# Patient Record
Sex: MALE | HISPANIC OR LATINO | Employment: FULL TIME | URBAN - METROPOLITAN AREA
[De-identification: names, ages, dates, MRNs, and addresses within clinical notes are randomized per-mention and may not be internally consistent; named-entity substitution may affect disease eponyms.]

---

## 2019-05-20 ENCOUNTER — OFFICE VISIT (OUTPATIENT)
Dept: FAMILY MEDICINE | Facility: CLINIC | Age: 29
End: 2019-05-20
Payer: COMMERCIAL

## 2019-05-20 VITALS
BODY MASS INDEX: 21.98 KG/M2 | HEIGHT: 72 IN | WEIGHT: 162.3 LBS | HEART RATE: 72 BPM | TEMPERATURE: 98.3 F | SYSTOLIC BLOOD PRESSURE: 125 MMHG | DIASTOLIC BLOOD PRESSURE: 72 MMHG

## 2019-05-20 DIAGNOSIS — K13.70 MOUTH LESION: ICD-10-CM

## 2019-05-20 DIAGNOSIS — Z11.3 SCREEN FOR STD (SEXUALLY TRANSMITTED DISEASE): ICD-10-CM

## 2019-05-20 DIAGNOSIS — Z00.00 ROUTINE HISTORY AND PHYSICAL EXAMINATION OF ADULT: Primary | ICD-10-CM

## 2019-05-20 DIAGNOSIS — Z86.69 HISTORY OF SCIATICA: ICD-10-CM

## 2019-05-20 DIAGNOSIS — Z13.1 SCREENING FOR DIABETES MELLITUS: ICD-10-CM

## 2019-05-20 DIAGNOSIS — M25.531 RIGHT WRIST PAIN: ICD-10-CM

## 2019-05-20 DIAGNOSIS — R20.2 RIGHT HAND PARESTHESIA: ICD-10-CM

## 2019-05-20 DIAGNOSIS — Z13.220 LIPID SCREENING: ICD-10-CM

## 2019-05-20 PROCEDURE — 99385 PREV VISIT NEW AGE 18-39: CPT | Performed by: INTERNAL MEDICINE

## 2019-05-20 PROCEDURE — 99213 OFFICE O/P EST LOW 20 MIN: CPT | Mod: 25 | Performed by: INTERNAL MEDICINE

## 2019-05-20 ASSESSMENT — MIFFLIN-ST. JEOR: SCORE: 1726.25

## 2019-05-20 NOTE — PROGRESS NOTES
SUBJECTIVE:   CC: Ten Haro is an 28 year old male who presents for preventative health visit.     Healthy Habits:     Getting at least 3 servings of Calcium per day:  NO (1-2 servins per day)    Bi-annual eye exam:  Yes    Dental care twice a year:  NO (once)    Sleep apnea or symptoms of sleep apnea:  None    Diet:  Regular (no restrictions)    Frequency of exercise:  None (Detail cars, moving around)    Duration of exercise:  N/A    Taking medications regularly:  Not Applicable    Barriers to taking medications:  Not applicable    Medication side effects:  Not applicable    PHQ-2 Total Score: 0    Additional concerns today:  Yes (Right wrist pain, bump on left top of mouth)      Today's PHQ-2 Score:   PHQ-2 ( 1999 Pfizer) 5/20/2019   Q1: Little interest or pleasure in doing things 0   Q2: Feeling down, depressed or hopeless 0   PHQ-2 Score 0       Abuse: Current or Past(Physical, Sexual or Emotional)- No  Do you feel safe in your environment? Yes    Social History     Tobacco Use     Smoking status: Never Smoker     Smokeless tobacco: Never Used   Substance Use Topics     Alcohol use: Yes     Comment: 1 drink per month     If you drink alcohol do you typically have >3 drinks per day or >7 drinks per week? No    Alcohol Use 5/20/2019   Prescreen: >3 drinks/day or >7 drinks/week? No       Last PSA: No results found for: PSA    Reviewed orders with patient. Reviewed health maintenance and updated orders accordingly - Yes  Labs reviewed in EPIC  BP Readings from Last 3 Encounters:   05/20/19 125/72   08/28/14 118/81    Wt Readings from Last 3 Encounters:   05/20/19 73.6 kg (162 lb 4.8 oz)   08/28/14 63.5 kg (140 lb)                  Patient Active Problem List   Diagnosis     Mouth lesion     Right hand paresthesia     Right wrist pain     Routine history and physical examination of adult     History of sciatica     History reviewed. No pertinent surgical history.    Social History     Tobacco Use      Smoking status: Never Smoker     Smokeless tobacco: Never Used   Substance Use Topics     Alcohol use: Yes     Comment: 1 drink per month     Family History   Problem Relation Age of Onset     Hypertension Mother      Asthma Sister          No current outpatient medications on file.     No Known Allergies    Reviewed and updated as needed this visit by clinical staff  Tobacco  Allergies  Meds  Problems  Med Hx  Surg Hx  Fam Hx  Soc Hx          Reviewed and updated as needed this visit by Provider  Tobacco  Allergies  Meds  Problems  Med Hx  Surg Hx  Fam Hx  Soc Hx         History reviewed. No pertinent past medical history.   History reviewed. No pertinent surgical history.  OB History   No data available       Review of Systems  CONSTITUTIONAL: NEGATIVE for fever, chills, change in weight denies snoring.  INTEGUMENTARY/SKIN: NEGATIVE for worrisome rashes, moles or lesions  EYES: NEGATIVE for vision changes or irritation surgery of laser surgery to both eyes.  ENT: NEGATIVE for ear, mouth and throat problems denies any hearing issues.  RESP: NEGATIVE for significant cough or SOB  CV: NEGATIVE for chest pain, palpitations or peripheral edema  GI: NEGATIVE for nausea, abdominal pain, heartburn, or change in bowel habits   male: negative for dysuria, hematuria, decreased urinary stream, erectile dysfunction, urethral discharge.  Denies any history of STDs  MUSCULOSKELETAL: NEGATIVE for significant arthralgias or myalgia, complains of right wrist pain.  Started after gardening he was hitting the ground hard on his wrist hurts to move.  Also complains of numbness of the right hand last 2 fingers feel cold or turn numbness and tingling mainly.  Denies any neck issues or pain.  Does have low back pain; the pain radiates from the left lower pelvis to the left leg he was told he had sciatica in the past was seen by chiropractor 2 years ago and had some adjustment done.  Currently has no symptoms  "related.  NEURO: NEGATIVE for weakness, dizziness. Also complains of numbness of the right hand last 2 fingers feel cold or turn numbness and tingling mainly.   PSYCHIATRIC: NEGATIVE for changes in mood or affect denies any depression or anxiety.    OBJECTIVE:   /72 (BP Location: Right arm, Cuff Size: Adult Regular)   Pulse 72   Temp 98.3  F (36.8  C) (Oral)   Ht 1.816 m (5' 11.5\")   Wt 73.6 kg (162 lb 4.8 oz)   BMI 22.32 kg/m      Physical Exam  GENERAL: healthy, alert and no distress  EYES: Eyes grossly normal to inspection, PERRL and conjunctivae and sclerae normal  HENT: ear canals and TM's normal, nose and mouth without ulcers or lesions  NECK: no adenopathy, no asymmetry, masses, or scars and thyroid normal to palpation  RESP: lungs clear to auscultation - no rales, rhonchi or wheezes  CV: regular rate and rhythm, normal S1 S2, no S3 or S4, no murmur, click or rub, no peripheral edema and peripheral pulses strong  ABDOMEN: soft, nontender, no hepatosplenomegaly, no masses and bowel sounds normal  MS: no gross musculoskeletal defects noted, no edema.  Right wrist no swelling or edema no signs of acute synovitis.  Full range of motion of wrist with passive movement and against resistance.  Negative radial Tinel and Phalen sign.  Negative ulnar Tinel sign.  SKIN: no suspicious lesions or rashes.  Normal temperature of right hand fingers.  NEURO: Normal strength and tone, mentation intact and speech normal.  Intact sensation to monofilament test in the right hand fingers.  Motor power of the right hand digits.  PSYCH: mentation appears normal, affect: Slightly flat apathy.  : Normal testicles and penis.  No inguinal hernias.    Diagnostic Test Results:  Labs reviewed in Epic    ASSESSMENT/PLAN:   Ten was seen today for physical.    Diagnoses and all orders for this visit:    Routine history and physical examination of adult    Screen for STD (sexually transmitted disease)  -     HIV Screening; " "Future  -     HSV 1 and 2 DNA by PCR; Future  -     **Hepatitis C Screen Reflex to RNA FUTURE anytime; Future  -     NEISSERIA GONORRHOEA PCR; Future  -     CHLAMYDIA TRACHOMATIS PCR; Future  -     Treponema Abs w Reflex to RPR and Titer; Future    Lipid screening  -     Lipid panel reflex to direct LDL Fasting; Future    Screening for diabetes mellitus  -     **Glucose FUTURE anytime; Future    Mouth lesion  -     OTOLARYNGOLOGY REFERRAL    Right wrist pain    Right hand paresthesia    History of sciatica    For the wrist pain there is no signs of acute synovitis on exam.  Advised to wear a wrist splint.  For the next 2 to 3 weeks and reassess.  If persistent pain will consider further imaging/testing, there is no swelling of wrist and has full range of motion of the wrist.  Intact neurovascular exam of the right upper extremity.  As far as the tingling and feeling cold of the distal digits of the fourth and fifth digits of the right hand could be a pattern of ulnar neuropathy although Tinnel and Phalen signs were negative and intact neurologic exam and motor strength of right hand,  If symptoms progress will consider EMG/nerve conduction velocity testing.  Patient to follow-up in the next couple weeks for reassessment.  Take as needed Non-steroidal's, stressed the importance of wrist splint for now.    Time spent face-to-face was 15 minutes minutes with more than 50% counseling, review of records and addressing multiple health problems as listed in Assessment Plan in addition to physical exam..    COUNSELING:   Reviewed preventive health counseling, as reflected in patient instructions  Special attention given to:        Regular exercise       Healthy diet/nutrition       Immunizations: up-to-date         Safe sex practices/STD prevention    Estimated body mass index is 22.32 kg/m  as calculated from the following:    Height as of this encounter: 1.816 m (5' 11.5\").    Weight as of this encounter: 73.6 kg (162 lb " 4.8 oz).          reports that he has never smoked. He has never used smokeless tobacco.      Counseling Resources:  ATP IV Guidelines  Pooled Cohorts Equation Calculator  FRAX Risk Assessment  ICSI Preventive Guidelines  Dietary Guidelines for Americans, 2010  USDA's MyPlate  ASA Prophylaxis  Lung CA Screening    Jen Perez MD  [facility]

## 2019-12-24 ENCOUNTER — HOSPITAL ENCOUNTER (EMERGENCY)
Facility: CLINIC | Age: 29
Discharge: HOME | End: 2019-12-24
Attending: EMERGENCY MEDICINE | Admitting: EMERGENCY MEDICINE
Payer: COMMERCIAL

## 2019-12-24 VITALS
RESPIRATION RATE: 18 BRPM | SYSTOLIC BLOOD PRESSURE: 134 MMHG | WEIGHT: 155 LBS | HEART RATE: 71 BPM | TEMPERATURE: 98.1 F | DIASTOLIC BLOOD PRESSURE: 83 MMHG | OXYGEN SATURATION: 99 % | BODY MASS INDEX: 21.32 KG/M2

## 2019-12-24 DIAGNOSIS — K01.1 IMPACTED THIRD MOLAR TOOTH: ICD-10-CM

## 2019-12-24 DIAGNOSIS — K04.7 DENTAL ABSCESS: ICD-10-CM

## 2019-12-24 PROCEDURE — 99284 EMERGENCY DEPT VISIT MOD MDM: CPT | Mod: 25

## 2019-12-24 PROCEDURE — 64400 NJX AA&/STRD TRIGEMINAL NRV: CPT

## 2019-12-24 NOTE — ED PROVIDER NOTES
Visit Date:   12/24/2019      CHIEF COMPLAINT:  Dental pain.      HISTORY OF PRESENT ILLNESS:  This is a 29-year-old male who is here complaining of increasing swelling to his right lower jaw that has been increasing over the past few days.  It hurts to swallow.  He has no difficulty swallowing, no difficulty breathing.  No difficulty with secretions.  No fever.  Pain is worsened with chewing.  He has no wisdom teeth in that area.      ALLERGIES:  NONE.      MEDICATIONS:  None.      PAST SURGICAL HISTORY:  None.      SOCIAL HISTORY:  The patient does not smoke cigarettes and does not drink alcohol.      REVIEW OF SYSTEMS:   CONSTITUTIONAL:  Negative for fevers.   HEENT:  Positive for dental pain.     All other review of systems are negative.      PHYSICAL EXAMINATION:   VITAL SIGNS:  Blood pressure 134/83, temperature is 98.1, pulse 71, respiratory rate 18, pulse ox 99% on room air.   GENERAL:  The patient is well-appearing, nontoxic.   ORAL:  He has mild swelling to his right lower mucosal lining of his impacted third molar.  No significant swelling is noted.   NECK:  Supple.   NEUROLOGIC:  The patient is awake and alert.  No meningeal signs.   DERMATOLOGIC:  No facial erythema or swelling.      EMERGENCY DEPARTMENT COURSE AND TREATMENT:  The patient was seen by ED physician and ED nurse.  All findings were reviewed.  All questions were answered.      PROCEDURE NOTE:  Right lower inferior alveolar dental block.  Prep, lidocaine gel.  Benzocaine 20% was applied to area.  A 27-gauge needle was inserted into the approximated space of the inferior alveolar nerve to the right lower mandible, and 5 mL of bupivacaine without epinephrine 0.5% was instilled without complication.      MEDICAL DECISION MAKING:  This is a 29-year-old male who came in for dental pain with what appears to be developing an impacted third molar, possible early abscess.  He did get relief from a dental block.  See procedure note.  While he has pain  with swallowing. he has no difficulty swallowing or holding his secretions.  No concerns for abscess pushing along his airway.  He has been made aware and given warning signs of this.  He is otherwise safe for discharge.  He will be discharged home with a course of Augmentin and told to follow up with a dentist or oral surgeon.  He should return for any expanding swelling, difficulty swallowing, breathing, drooling.      DISPOSITION:  Home.  Follow up with dentist.      DIAGNOSES:   1.  Third molar impacted.   2.  Dental abscess.         MICHI SHARIF MD             D: 2019   T: 2019   MT: SANDEE      Name:     JENISE CEVALLOS   MRN:      [id number]        Account:      [id number]   :      1990           Visit Date:   2019      Document: [id number]

## 2019-12-24 NOTE — ED PROVIDER NOTES
"  History     Chief Complaint:  ***  No chief complaint on file.      HPI   Ten Haro is a 29 year old male who presents with ***    Allergies:  ***  No Known Drug Allergies     Medications:    ***  Medications reviewed. No pertinent medications.      Past Medical History:    ***  Mouth lesion  Right hand paraesthesi  Sciatica     Past Surgical History:    ***  Surgical history reviewed. No pertinent surgical history.     Family History:    ***  Asthma  Hypertension     Social History:  The patient was accompanied to the ED by ***.  Smoking Status: Never Smoker  Smokeless Tobacco: Never Used  Alcohol Use: Positive  Drug Use: Negative  PCP: Jen Perez   Marital Status:  Single      Review of Systems  ***    Physical Exam   No data found.       Physical Exam  ***    Emergency Department Course   ECG:  ***    Imaging:  {Radiographic findings?:095389029::\" \"}  ***    Laboratory:  ***    Procedures:  ***        Interventions:  ***  {Response to meds:283517::\" \"}    Emergency Department Course:  ***       Impression & Plan       {trauma activation?:679894::\" \"}  CMS Diagnoses: {Sepsis/Septic Shock/Stemi/Stroke:099033::\" \"}       Medical Decision Making:  ***  Critical Care time:  {none or minutes:246702::\"none\"}    Diagnosis:  No diagnosis found.    Disposition:  {discharged to home/discharged to home with.../Admitted to...:286563}    Discharge Medications:  New Prescriptions    No medications on file       Anthony Clark  12/24/2019   [facility] EMERGENCY DEPARTMENT    "

## 2019-12-24 NOTE — ED AVS SNAPSHOT
[facility] Emergency Department  [address]  [facility] [address]  Phone:  [phone]  Fax:  [phone]                                    Ten Haro   MRN: [id number]    Department:  [facility] Emergency Department   Date of Visit:  12/24/2019           After Visit Summary Signature Page    I have received my discharge instructions, and my questions have been answered. I have discussed any challenges I see with this plan with the nurse or doctor.    ..........................................................................................................................................  Patient/Patient Representative Signature      ..........................................................................................................................................  Patient Representative Print Name and Relationship to Patient    ..................................................               ................................................  Date                                   Time    ..........................................................................................................................................  Reviewed by Signature/Title    ...................................................              ..............................................  Date                                               Time          22EPIC Rev 08/18

## 2020-06-19 ENCOUNTER — OFFICE VISIT (OUTPATIENT)
Dept: URGENT CARE | Facility: URGENT CARE | Age: 30
End: 2020-06-19
Payer: COMMERCIAL

## 2020-06-19 VITALS
WEIGHT: 164 LBS | TEMPERATURE: 98.8 F | HEART RATE: 71 BPM | BODY MASS INDEX: 23.48 KG/M2 | SYSTOLIC BLOOD PRESSURE: 125 MMHG | HEIGHT: 70 IN | OXYGEN SATURATION: 100 % | RESPIRATION RATE: 18 BRPM | DIASTOLIC BLOOD PRESSURE: 79 MMHG

## 2020-06-19 DIAGNOSIS — R09.81 CONGESTION OF PARANASAL SINUS: Primary | ICD-10-CM

## 2020-06-19 DIAGNOSIS — R42 DIZZINESS: ICD-10-CM

## 2020-06-19 PROCEDURE — 99203 OFFICE O/P NEW LOW 30 MIN: CPT | Performed by: FAMILY MEDICINE

## 2020-06-19 RX ORDER — MECLIZINE HYDROCHLORIDE 25 MG/1
25 TABLET ORAL 3 TIMES DAILY PRN
Qty: 30 TABLET | Refills: 0 | Status: SHIPPED | OUTPATIENT
Start: 2020-06-19 | End: 2022-06-23

## 2020-06-19 RX ORDER — CETIRIZINE HYDROCHLORIDE 10 MG/1
10 TABLET ORAL ONCE
Qty: 30 TABLET | Refills: 0 | Status: SHIPPED | OUTPATIENT
Start: 2020-06-19 | End: 2020-06-19

## 2020-06-19 RX ORDER — FLUTICASONE PROPIONATE 50 MCG
1 SPRAY, SUSPENSION (ML) NASAL DAILY
Qty: 9.9 ML | Refills: 0 | Status: SHIPPED | OUTPATIENT
Start: 2020-06-19 | End: 2022-06-23

## 2020-06-19 ASSESSMENT — MIFFLIN-ST. JEOR: SCORE: 1705.15

## 2020-06-19 NOTE — PROGRESS NOTES
"Chief Complaint   Patient presents with     Urgent Care     Sinus Problem     possible sinus infection ongoing since last week      SUBJECTIVE:  Ten Haro is a 29 year old male here with concerns about sinus congestion   He states onset of symptoms were 1 week(s) ago.  He has had maxillary, frontal pressure. Course of illness is worsening. Severity moderate  Current and Associated symptoms: nasal congestion, facial pain/pressure and dizziness   Denies any loss of smell   Predisposing factors include none. Recent treatment has included: Antihistamine    No past medical history on file.  Social History     Tobacco Use     Smoking status: Never Smoker     Smokeless tobacco: Never Used   Substance Use Topics     Alcohol use: Yes     Comment: 1 drink per month       ROS:  10 point ROS of systems including Constitutional, Eyes, Respiratory, Cardiovascular, Gastroenterology, Genitourinary, Integumentary, Muscularskeletal, Psychiatric were all negative except for pertinent positives noted in my HPI           OBJECTIVE:  /79 (BP Location: Right arm, Patient Position: Sitting, Cuff Size: Adult Regular)   Pulse 71   Temp 98.8  F (37.1  C) (Tympanic)   Resp 18   Ht 1.778 m (5' 10\")   Wt 74.4 kg (164 lb)   SpO2 100%   BMI 23.53 kg/m    Exam:GENERAL APPEARANCE: healthy, alert and no distress  EYES: EOMI,  PERRL, conjunctiva clear  HENT: ear canals and TM's normal.  Nose and mouth without ulcers, there is erythema with congestion but no lesions  NECK: supple, nontender, no lymphadenopathy  RESP: lungs clear to auscultation - no rales, rhonchi or wheezes  CV: regular rates and rhythm, normal S1 S2, no murmur noted  ABDOMEN:  soft, nontender, no HSM or masses and bowel sounds normal  NEURO: Normal strength and tone, sensory exam grossly normal,  normal speech and mentation  Finger nose test intact  Rapid alternating movements WNL  SKIN: no suspicious lesions or rashes  PSYCH: mentation appears " normal    ASSESSMENT:  Ten was seen today for urgent care and sinus problem.    Diagnoses and all orders for this visit:    Congestion of paranasal sinus  -     fluticasone (FLONASE) 50 MCG/ACT nasal spray; Spray 1 spray into both nostrils daily  -     cetirizine (ZYRTEC) 10 MG tablet; Take 1 tablet (10 mg) by mouth once for 1 dose    Dizziness  -     meclizine (ANTIVERT) 25 MG tablet; Take 1 tablet (25 mg) by mouth 3 times daily as needed for dizziness      Differential diagnosis allergic rhinitis/viral infection/sinusitis/postnasal drip/possible COVIDinfection  PLAN:  See orders  Follow up with primary clinic if not improving  Discussed with patient that symptoms seem to be related to allergic rhinitis will treat with Flonase and antihistamines to help with the symptoms also for the dizziness which is possibly related to labyrinthitis will treat with meclizine discussed with patient this might cause some sleepiness.  If notices any worsening dizziness or any body ache fatigue or loss of smell patient was advised should get tested for COVID.  Currently as he does not have any fever or any fatigue symptoms or any loss of smell I doubt we need to worry about COVID.  Follow up if  symptoms fail to improve or worsens   Pt understood and agreed with plan       Amie Tejeda MD

## 2020-09-05 ENCOUNTER — VIRTUAL VISIT (OUTPATIENT)
Dept: FAMILY MEDICINE | Facility: OTHER | Age: 30
End: 2020-09-05

## 2020-09-05 NOTE — PROGRESS NOTES
"Date: 2020 11:15:54  Clinician: Srikanth Mckeon  Clinician NPI: [id number]  Patient: Ten Beckwith  Patient : 1990  Patient Address: [facility] [place] [place] [address]  Patient Phone: [phone]  Visit Protocol: URI  Patient Summary:  Ten is a 30 year old ( : 1990 ) male who initiated a Visit for COVID-19 (Coronavirus) evaluation and screening. When asked the question \"Please sign me up to receive news, health information and promotions. \", Ten responded \"No\".    When asked when his symptoms started, Ten reported that he does not have any symptoms.   He denies taking antibiotic medication in the past month and having recent facial or sinus surgery in the past 60 days.    Pertinent COVID-19 (Coronavirus) information  In the past 14 days, Ten has not worked in a congregate living setting.   He does not work or volunteer as healthcare worker or a  and does not work or volunteer in a healthcare facility.   Ten also has not lived in a congregate living setting in the past 14 days. He lives with a healthcare worker.   Ten has had a close contact with a laboratory-confirmed COVID-19 patient in the last 14 days. Additional information about contact with COVID-19 (Coronavirus) patient as reported by the patient (free text): Live with girlfriend who got a call this morning for a positive test    Patient reported they are living in the same household with a COVID-19 positive patient.  Since 2019, Ten and has not had upper respiratory infection or influenza-like illness. Has not been diagnosed with lab-confirmed COVID-19 test   Pertinent medical history  Ten needs a return to work/school note.   Weight: 160 lbs   Ten does not smoke or use smokeless tobacco.   Weight: 160 lbs    MEDICATIONS: No current medications, ALLERGIES: NKDA  Clinician Response:  Dear Ten,   Based on your exposure to COVID-19 (coronavirus), we would like to test you for this virus.  1. Please " call [phone] to schedule your visit. Explain [facility]. Be ready to share your [facility] visit ID number.  The following will serve as your written order for this COVID Test, ordered by me, for the indication of suspected COVID [Z20.828]: The test will be ordered in [email or web address], our electronic health record, after you are scheduled. It will show as ordered and authorized by Fabricio Barahona MD.  Order: COVID-19 (coronavirus) PCR for ASYMPTOMATIC EXPOSURE testing from [facility] hospital.  If you know you have had close contact with someone who tested positive, you should be quarantined for 14 days after this exposure. You should stay in quarantine for the14 days even if the covid test is negative, the optimal time to test after exposure is 5-7 days from the exposure  Quarantine means   What should I do?  For safety, it's very important to follow these rules. Do this for 14 days after the date you were last exposed to the virus..  Stay home and away from others. Don't go to school or anywhere else. Generally quarantine means staying home from work but there are some exceptions to this. Please contact your workplace.   No hugging, kissing or shaking hands.  Don't let anyone visit.  Cover your mouth and nose with a mask, tissue or washcloth to avoid spreading germs.  Wash your hands and face often. Use soap and water.  What are the symptoms of COVID-19?  The most common symptoms are cough, fever and trouble breathing. Less common symptoms include headache, body aches, fatigue (feeling very tired), chills, sore throat, stuffy or runny nose, diarrhea (loose poop), loss of taste or smell, belly pain, and nausea or vomiting (feeling sick to your stomach or throwing up).  After 14 days, if you have still don't have symptoms, you likely don't have this virus.  If you develop symptoms, follow these guidelines.  If you're normally healthy: Please start another [facility] visit to report your symptoms. Go to  [email or web address].  If you have a serious health problem (like cancer, heart failure, an organ transplant or kidney disease): Call your specialty clinic. Let them know that you might have COVID-19.  2. When it's time for your COVID test:  Stay at least 6 feet away from others. (If someone will drive you to your test, stay in the backseat, as far away from the  as you can.)  Cover your mouth and nose with a mask, tissue or washcloth.  Go straight to the testing site. Don't make any stops on the way there or back.  Please note  Caregivers in these groups are at risk for severe illness due to COVID-19:  o People 65 years and older  o People who live in a nursing home or long-term care facility  o People with chronic disease (lung, heart, cancer, diabetes, kidney, liver, immunologic)  o People who have a weakened immune system, including those who:  Are in cancer treatment  Take medicine that weakens the immune system, such as corticosteroids  Had a bone marrow or organ transplant  Have an immune deficiency  Have poorly controlled HIV or AIDS  Are obese (body mass index of 40 or higher)  Smoke regularly  Where can I get more information?  [facility] -- About COVID-19: [email or web address]  CDC -- What to Do If You're Sick: www.cdc.gov/coronavirus/2019-ncov/about/steps-when-sick.html  CDC -- Ending Home Isolation: www.cdc.gov/coronavirus/2019-ncov/hcp/disposition-in-home-patients.html  [facility] -- Caring for Someone: www.cdc.gov/coronavirus/2019-ncov/if-you-are-sick/care-for-someone.html  [facility] -- Interim Guidance for Hospital Discharge to Home: www.health.[facility].[place]/diseases/coronavirus/hcp/hospdischarge.pdf  [facility] clinical trials (COVID-19 research studies): [email or web address]  Below are the COVID-19 hotlines at the [place] Department of Health ([facility]). Interpreters are available.  For health questions: Call 167-516-6440 or 1-848.241.7600 (7 a.m. to 7 p.m.)  For  questions about schools and childcare: Call 031-950-6965 or 1-240.902.1769 (7 a.m. to 7 p.m.)    Diagnosis: Cough  Diagnosis ICD: R05

## 2020-09-06 DIAGNOSIS — Z20.822 SUSPECTED 2019 NOVEL CORONAVIRUS INFECTION: Primary | ICD-10-CM

## 2020-09-07 LAB
SARS-COV-2 RNA SPEC QL NAA+PROBE: NOT DETECTED
SPECIMEN SOURCE: NORMAL

## 2020-12-14 ENCOUNTER — HEALTH MAINTENANCE LETTER (OUTPATIENT)
Age: 30
End: 2020-12-14

## 2021-01-07 ENCOUNTER — VIRTUAL VISIT (OUTPATIENT)
Dept: FAMILY MEDICINE | Facility: OTHER | Age: 31
End: 2021-01-07

## 2021-01-07 DIAGNOSIS — Z20.822 SUSPECTED COVID-19 VIRUS INFECTION: Primary | ICD-10-CM

## 2021-01-07 DIAGNOSIS — Z20.822 SUSPECTED COVID-19 VIRUS INFECTION: ICD-10-CM

## 2021-01-07 LAB
SARS-COV-2 RNA RESP QL NAA+PROBE: ABNORMAL
SPECIMEN SOURCE: ABNORMAL

## 2021-01-07 PROCEDURE — U0003 INFECTIOUS AGENT DETECTION BY NUCLEIC ACID (DNA OR RNA); SEVERE ACUTE RESPIRATORY SYNDROME CORONAVIRUS 2 (SARS-COV-2) (CORONAVIRUS DISEASE [COVID-19]), AMPLIFIED PROBE TECHNIQUE, MAKING USE OF HIGH THROUGHPUT TECHNOLOGIES AS DESCRIBED BY CMS-2020-01-R: HCPCS | Performed by: PHYSICIAN ASSISTANT

## 2021-01-07 PROCEDURE — U0005 INFEC AGEN DETEC AMPLI PROBE: HCPCS | Performed by: PHYSICIAN ASSISTANT

## 2021-01-07 NOTE — PROGRESS NOTES
"Date: 2021 12:49:23  Clinician: Esmer Pascual  Clinician NPI: [id number]  Patient: Ten Beckwith  Patient : 1990  Patient Address: [facility] [place] [place] [address]  Patient Phone: [phone]  Visit Protocol: URI  Patient Summary:  Ten is a 30 year old ( : 1990 ) male who initiated a [facility] Visit for COVID-19 (Coronavirus) evaluation and screening. When asked the question \"Please sign me up to receive news, health information and promotions. \", Ten responded \"No\".    When asked when his symptoms started, Ten reported that he does not have any symptoms.   He denies having recent facial or sinus surgery in the past 60 days and taking antibiotic medication in the past month.    Pertinent COVID-19 (Coronavirus) information  Ten does not work or volunteer as healthcare worker or a . In the past 14 days, Ten has not worked or volunteered at a healthcare facility or group living setting.   In the past 14 days, he also has not lived in a congregate living setting.   Ten has had a close contact with a laboratory-confirmed COVID-19 patient in the last 14 days. Date Ten was exposed to the laboratory-confirmed COVID-19 patient: 2021   Additional information about contact with COVID-19 (Coronavirus) patient as reported by the patient (free text): Jb had cold like symptoms for the last five days went and got tested and received a call today she was positive.   Ten is living in the same household with the COVID-19 positive patient. He was in an enclosed space for greater than 15 minutes with the COVID-19 patient.   During the encounter, neither were wearing masks.   Ten has been tested for COVID-19.      Date(s) of his COVID-19 test as reported by the patient (free text): 2020       Result of COVID-19 test as reported by the patient (free text): Negative       Type of test as reported by the patient (free text): Nasal        Ten has not received a COVID-19 " vaccine.   Pertinent medical history  He has not been told by his provider to avoid NSAIDs.   Ten does not have diabetes. He denies having immunosuppressive conditions (e.g., chemotherapy, HIV, organ transplant, long-term use of steroids or other immunosuppressive medications, splenectomy). He denies having congestive heart failure and severe COPD. He does not have asthma.   Ten needs a return to work/school note.   Ten does not smoke or use smokeless tobacco.   Additional information as reported by the patient (free text): Slight shortness of breath otherwise no other symptoms   Weight: 170 lbs    MEDICATIONS: No current medications, ALLERGIES: NKDA  Clinician Response:  Dear Ten,   Based on your exposure to COVID-19 (coronavirus), we would like to test you for this virus.  1. Please call [phone] to schedule your visit. Explain that you were referred by [facility] to have a COVID-19 test. Be ready to share your [facility] visit ID number.  * If you need to schedule in [facility] please call [phone] or for [employer] employees please call [phone].   * If you need to schedule in the [place] area please call [phone]. [employer] employees call [phone].   The following will serve as your written order for this COVID Test, ordered by me, for the indication of suspected COVID [Z20.828]: The test will be ordered in [employer], our electronic health record, after you are scheduled. It will show as ordered and authorized by Fabricio Barahona MD.  Order: COVID-19 (coronavirus) PCR for ASYMPTOMATIC EXPOSURE testing from [facility].   If you know you have had close contact with someone who tested positive, you should be quarantined for 14 days after this exposure. You should stay in quarantine for the14 days even if the covid test is negative, the optimal time to test after exposure is 5-7 days from the exposure  Quarantine means   What should I do?  For safety, it's very important to follow these rules. Do this for 14 days  after the date you were last exposed to the virus..  Stay home and away from others. Don't go to school or anywhere else. Generally quarantine means staying home from work but there are some exceptions to this. Please contact your workplace.   No hugging, kissing or shaking hands.  Don't let anyone visit.  Cover your mouth and nose with a mask, tissue or washcloth to avoid spreading germs.  Wash your hands and face often. Use soap and water.  What are the symptoms of COVID-19?  The most common symptoms are cough, fever and trouble breathing. Less common symptoms include headache, body aches, fatigue (feeling very tired), chills, sore throat, stuffy or runny nose, diarrhea (loose poop), loss of taste or smell, belly pain, and nausea or vomiting (feeling sick to your stomach or throwing up).  After 14 days, if you have still don't have symptoms, you likely don't have this virus.  If you develop symptoms, follow these guidelines.  If you're normally healthy: Please start another [facility] visit to report your symptoms. Go to [email or web address].  If you have a serious health problem (like cancer, heart failure, an organ transplant or kidney disease): Call your specialty clinic. Let them know that you might have COVID-19.  2. When it's time for your COVID test:  Stay at least 6 feet away from others. (If someone will drive you to your test, stay in the backseat, as far away from the  as you can.)  Cover your mouth and nose with a mask, tissue or washcloth.  Go straight to the testing site. Don't make any stops on the way there or back.  Please note  Caregivers in these groups are at risk for severe illness due to COVID-19:  o People 65 years and older  o People who live in a nursing home or long-term care facility  o People with chronic disease (lung, heart, cancer, diabetes, kidney, liver, immunologic)  o People who have a weakened immune system, including those who:  Are in cancer treatment  Take medicine that weakens the  immune system, such as corticosteroids  Had a bone marrow or organ transplant  Have an immune deficiency  Have poorly controlled HIV or AIDS  Are obese (body mass index of 40 or higher)  Smoke regularly  Where can I get more information?  [facility] -- About COVID-19: [email or web address]  CDC -- What to Do If You're Sick: www.cdc.gov/coronavirus/2019-ncov/about/steps-when-sick.html  CDC -- Ending Home Isolation: www.cdc.gov/coronavirus/2019-ncov/hcp/disposition-in-home-patients.html  [facility] -- Caring for Someone: www.cdc.gov/coronavirus/2019-ncov/if-you-are-sick/care-for-someone.html  [facility] -- Interim Guidance for Hospital Discharge to Home: www.health.[facility].[place]./diseases/coronavirus/hcp/hospdischarge.pdf  [facility] clinical trials (COVID-19 research studies): clinicalaffairs.[facility]-clinical-trials  Below are the COVID-19 hotlines at the [facility] of Health ([facility]). Interpreters are available.  For health questions: Call 861-728-6699 or 1-941.306.3363 (7 a.m. to 7 p.m.)  For questions about schools and childcare: Call 771-802-6143 or 1-633.730.8996 (7 a.m. to 7 p.m.)    Diagnosis: Contact with and (suspected) exposure to other viral communicable diseases  Diagnosis ICD: Z20.828

## 2021-06-02 ENCOUNTER — RECORDS - HEALTHEAST (OUTPATIENT)
Dept: ADMINISTRATIVE | Facility: CLINIC | Age: 31
End: 2021-06-02

## 2021-08-29 ENCOUNTER — HOSPITAL ENCOUNTER (EMERGENCY)
Facility: CLINIC | Age: 31
Discharge: LEFT BEFORE DISCHARGE | End: 2021-08-29
Admitting: EMERGENCY MEDICINE
Payer: COMMERCIAL

## 2021-08-29 VITALS
RESPIRATION RATE: 18 BRPM | DIASTOLIC BLOOD PRESSURE: 87 MMHG | OXYGEN SATURATION: 100 % | HEART RATE: 71 BPM | SYSTOLIC BLOOD PRESSURE: 133 MMHG | TEMPERATURE: 97.7 F

## 2021-08-29 LAB — SARS-COV-2 RNA RESP QL NAA+PROBE: NEGATIVE

## 2021-08-29 PROCEDURE — C9803 HOPD COVID-19 SPEC COLLECT: HCPCS

## 2021-08-29 PROCEDURE — 999N000104 HC STATISTIC NO CHARGE

## 2021-08-29 PROCEDURE — 87635 SARS-COV-2 COVID-19 AMP PRB: CPT | Performed by: EMERGENCY MEDICINE

## 2021-08-30 NOTE — ED NOTES
Pt expressed desire to Leave Without Being Seen by a provider. The risks of LWBS were explained to the patient who verbalized understanding. The patient was then presented with a Refusal of MSE form which he signed in my presence prior to departing.

## 2021-08-30 NOTE — ED TRIAGE NOTES
This am, started having headache. Around 1 took ibuprofen then started feeling nauseated. Took a nap and had diarrhea and vomiting around 6 PM.  Had first dose of vaccine. Due for 2nd dose on the 9th.

## 2021-10-02 ENCOUNTER — HEALTH MAINTENANCE LETTER (OUTPATIENT)
Age: 31
End: 2021-10-02

## 2022-01-22 ENCOUNTER — HEALTH MAINTENANCE LETTER (OUTPATIENT)
Age: 32
End: 2022-01-22

## 2022-06-23 ENCOUNTER — OFFICE VISIT (OUTPATIENT)
Dept: INTERNAL MEDICINE | Facility: CLINIC | Age: 32
End: 2022-06-23
Payer: COMMERCIAL

## 2022-06-23 VITALS
OXYGEN SATURATION: 98 % | SYSTOLIC BLOOD PRESSURE: 120 MMHG | DIASTOLIC BLOOD PRESSURE: 76 MMHG | TEMPERATURE: 99.5 F | BODY MASS INDEX: 24.77 KG/M2 | HEART RATE: 71 BPM | WEIGHT: 172.6 LBS

## 2022-06-23 DIAGNOSIS — L98.9 SKIN LESION: ICD-10-CM

## 2022-06-23 DIAGNOSIS — H92.02 LEFT EAR PAIN: Primary | ICD-10-CM

## 2022-06-23 DIAGNOSIS — R10.32 LEFT INGUINAL PAIN: ICD-10-CM

## 2022-06-23 PROCEDURE — 99214 OFFICE O/P EST MOD 30 MIN: CPT | Performed by: INTERNAL MEDICINE

## 2022-06-23 ASSESSMENT — ENCOUNTER SYMPTOMS
COUGH: 0
JOINT SWELLING: 0
DIZZINESS: 0
PALPITATIONS: 0
CHILLS: 0
FEVER: 0
HEARTBURN: 0
HEMATOCHEZIA: 0
MYALGIAS: 1
NAUSEA: 0
EYE PAIN: 0
CONSTIPATION: 0
HEMATURIA: 0
NERVOUS/ANXIOUS: 0
DYSURIA: 0
ARTHRALGIAS: 1
PARESTHESIAS: 0
SORE THROAT: 0
FREQUENCY: 0
DIARRHEA: 0
HEADACHES: 0
ABDOMINAL PAIN: 0
SHORTNESS OF BREATH: 0
WEAKNESS: 0

## 2022-06-23 NOTE — PROGRESS NOTES
Assessment & Plan     Left ear pain  Exam normal today. Patient asymptomatic for the last 2 weeks or so. Discussed that it's hard to say what could be causing this intermittent pain given intermittent nature of it in the absence of other symptoms. Encouraged him to monitor if anything was different in the hours leading up to these attacks that could help with diagnosis.    Left inguinal pain  I explained that I think I palpated a small left inguinal hernia on exam though I'm not 100% certain. He wanted to pursue imaging study that could confirm/refute that which I think is a good idea given my ambiguity here. U/S ordered. Gen surg referral placed in case this does show a hernia and he'd like to pursue treatment for it.  - US Hernia Evaluation; Future  - Adult General Surg Referral; Future    Skin lesion  Derm referral placed for skin check.  - Adult Dermatology Referral; Future    F/u with regular PCP at regular interval or sooner CHARLENE Dumont MD  [facility]    Dagmar Caal is a 32 year old who presents with a few concerns. This is the first time I have met Ten, who initially booked this as a physical but today requested to convert this to a routine office visit to discuss more pressing concerns. He notes some left ear pain that comes on roughly once a month. Lasts for 2-3 hours at a time then goes away spontaneously. No f/c. No sinus congestion. No vision changes. No rash. No hearing changes noted. He tells me he works at a job that involves loud noises but he tries to wear ear plugs. No ear popping. He also notes some L testicular pain that started ~6 months ago that's intermittent. Noting it more frequently now. No blood in urine or stools. No bulging. No dysuria. Putting pressure on his L foot can make it worse. Dull ache to sharp pain. No pain with bowel movements. His dad recently passed away from skin cancer and he's wondering about a skin check.    Review of  Systems   Constitutional, HEENT, cardiovascular, pulmonary, gi systems are negative, except as otherwise noted.      Objective    /76   Pulse 71   Temp 99.5  F (37.5  C) (Oral)   Wt 78.3 kg (172 lb 9.6 oz)   SpO2 98%   BMI 24.77 kg/m    Body mass index is 24.77 kg/m .     Physical Exam   GENERAL: alert and in no distress.  EYES: conjunctivae/corneas clear. EOMs grossly intact  HENT: Facies symmetric. TMs without bulging or purulence and show appropriate light reflex bilaterally. No impacted cerumen seen in bilateral ear canals. No tenderness when pulling on auricle.  RESP: No iWOB.  GI: NT, ND, without rebound tenderness or guarding. Small L inguinal hernia palpated with coughing. None felt on R side.  MSK: Moves all four extremities freely.  NEURO: CN II-XII grossly intact.

## 2022-06-24 ENCOUNTER — APPOINTMENT (OUTPATIENT)
Dept: CT IMAGING | Facility: CLINIC | Age: 32
End: 2022-06-24
Attending: EMERGENCY MEDICINE
Payer: COMMERCIAL

## 2022-06-24 ENCOUNTER — APPOINTMENT (OUTPATIENT)
Dept: GENERAL RADIOLOGY | Facility: CLINIC | Age: 32
End: 2022-06-24
Attending: EMERGENCY MEDICINE
Payer: COMMERCIAL

## 2022-06-24 ENCOUNTER — HOSPITAL ENCOUNTER (EMERGENCY)
Facility: CLINIC | Age: 32
Discharge: HOME | End: 2022-06-24
Attending: EMERGENCY MEDICINE | Admitting: EMERGENCY MEDICINE
Payer: COMMERCIAL

## 2022-06-24 VITALS
HEART RATE: 71 BPM | OXYGEN SATURATION: 98 % | SYSTOLIC BLOOD PRESSURE: 155 MMHG | RESPIRATION RATE: 18 BRPM | DIASTOLIC BLOOD PRESSURE: 94 MMHG

## 2022-06-24 DIAGNOSIS — S43.401A SPRAIN OF RIGHT SHOULDER, UNSPECIFIED SHOULDER SPRAIN TYPE, INITIAL ENCOUNTER: ICD-10-CM

## 2022-06-24 DIAGNOSIS — S06.0X0A CONCUSSION WITHOUT LOSS OF CONSCIOUSNESS, INITIAL ENCOUNTER: ICD-10-CM

## 2022-06-24 LAB
ANION GAP SERPL CALCULATED.3IONS-SCNC: 9 MMOL/L (ref 3–14)
APTT PPP: 27 SECONDS (ref 22–38)
BASOPHILS # BLD AUTO: 0.1 10E3/UL (ref 0–0.2)
BASOPHILS NFR BLD AUTO: 1 %
BUN SERPL-MCNC: 20 MG/DL (ref 7–30)
CALCIUM SERPL-MCNC: 9.1 MG/DL (ref 8.5–10.1)
CHLORIDE BLD-SCNC: 108 MMOL/L (ref 94–109)
CO2 SERPL-SCNC: 25 MMOL/L (ref 20–32)
CREAT SERPL-MCNC: 0.88 MG/DL (ref 0.66–1.25)
EOSINOPHIL # BLD AUTO: 0.1 10E3/UL (ref 0–0.7)
EOSINOPHIL NFR BLD AUTO: 2 %
ERYTHROCYTE [DISTWIDTH] IN BLOOD BY AUTOMATED COUNT: 12.7 % (ref 10–15)
ETHANOL SERPL-MCNC: <0.01 G/DL
GFR SERPL CREATININE-BSD FRML MDRD: >90 ML/MIN/1.73M2
GLUCOSE BLD-MCNC: 124 MG/DL (ref 70–99)
HCT VFR BLD AUTO: 42.7 % (ref 40–53)
HGB BLD-MCNC: 14.1 G/DL (ref 13.3–17.7)
IMM GRANULOCYTES # BLD: 0 10E3/UL
IMM GRANULOCYTES NFR BLD: 0 %
INR PPP: 1.09 (ref 0.85–1.15)
LYMPHOCYTES # BLD AUTO: 2.9 10E3/UL (ref 0.8–5.3)
LYMPHOCYTES NFR BLD AUTO: 40 %
MCH RBC QN AUTO: 27.4 PG (ref 26.5–33)
MCHC RBC AUTO-ENTMCNC: 33 G/DL (ref 31.5–36.5)
MCV RBC AUTO: 83 FL (ref 78–100)
MONOCYTES # BLD AUTO: 0.7 10E3/UL (ref 0–1.3)
MONOCYTES NFR BLD AUTO: 9 %
NEUTROPHILS # BLD AUTO: 3.4 10E3/UL (ref 1.6–8.3)
NEUTROPHILS NFR BLD AUTO: 48 %
NRBC # BLD AUTO: 0 10E3/UL
NRBC BLD AUTO-RTO: 0 /100
PLATELET # BLD AUTO: 213 10E3/UL (ref 150–450)
POTASSIUM BLD-SCNC: 3.8 MMOL/L (ref 3.4–5.3)
RBC # BLD AUTO: 5.14 10E6/UL (ref 4.4–5.9)
SODIUM SERPL-SCNC: 142 MMOL/L (ref 133–144)
WBC # BLD AUTO: 7.1 10E3/UL (ref 4–11)

## 2022-06-24 PROCEDURE — 71045 X-RAY EXAM CHEST 1 VIEW: CPT

## 2022-06-24 PROCEDURE — 96361 HYDRATE IV INFUSION ADD-ON: CPT

## 2022-06-24 PROCEDURE — 72170 X-RAY EXAM OF PELVIS: CPT

## 2022-06-24 PROCEDURE — 73030 X-RAY EXAM OF SHOULDER: CPT | Mod: RT

## 2022-06-24 PROCEDURE — 85730 THROMBOPLASTIN TIME PARTIAL: CPT | Performed by: EMERGENCY MEDICINE

## 2022-06-24 PROCEDURE — 258N000003 HC RX IP 258 OP 636: Performed by: EMERGENCY MEDICINE

## 2022-06-24 PROCEDURE — 36415 COLL VENOUS BLD VENIPUNCTURE: CPT | Performed by: EMERGENCY MEDICINE

## 2022-06-24 PROCEDURE — 93005 ELECTROCARDIOGRAM TRACING: CPT

## 2022-06-24 PROCEDURE — 80048 BASIC METABOLIC PNL TOTAL CA: CPT | Performed by: EMERGENCY MEDICINE

## 2022-06-24 PROCEDURE — 70450 CT HEAD/BRAIN W/O DYE: CPT

## 2022-06-24 PROCEDURE — 85025 COMPLETE CBC W/AUTO DIFF WBC: CPT | Performed by: EMERGENCY MEDICINE

## 2022-06-24 PROCEDURE — 96360 HYDRATION IV INFUSION INIT: CPT

## 2022-06-24 PROCEDURE — 72125 CT NECK SPINE W/O DYE: CPT

## 2022-06-24 PROCEDURE — 99285 EMERGENCY DEPT VISIT HI MDM: CPT | Mod: 25

## 2022-06-24 PROCEDURE — 85610 PROTHROMBIN TIME: CPT | Performed by: EMERGENCY MEDICINE

## 2022-06-24 PROCEDURE — 82077 ASSAY SPEC XCP UR&BREATH IA: CPT | Performed by: EMERGENCY MEDICINE

## 2022-06-24 RX ORDER — SODIUM CHLORIDE, SODIUM LACTATE, POTASSIUM CHLORIDE, CALCIUM CHLORIDE 600; 310; 30; 20 MG/100ML; MG/100ML; MG/100ML; MG/100ML
INJECTION, SOLUTION INTRAVENOUS CONTINUOUS
Status: DISCONTINUED | OUTPATIENT
Start: 2022-06-24 | End: 2022-06-25 | Stop reason: HOSPADM

## 2022-06-24 RX ORDER — LIDOCAINE 40 MG/G
CREAM TOPICAL
Status: DISCONTINUED | OUTPATIENT
Start: 2022-06-24 | End: 2022-06-25 | Stop reason: HOSPADM

## 2022-06-24 RX ADMIN — SODIUM CHLORIDE, POTASSIUM CHLORIDE, SODIUM LACTATE AND CALCIUM CHLORIDE 1000 ML: 600; 310; 30; 20 INJECTION, SOLUTION INTRAVENOUS at 20:23

## 2022-06-24 ASSESSMENT — ENCOUNTER SYMPTOMS
NAUSEA: 1
HEADACHES: 1
ARTHRALGIAS: 1
PALPITATIONS: 1
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
## 2022-06-27 ENCOUNTER — HOSPITAL ENCOUNTER (OUTPATIENT)
Dept: ULTRASOUND IMAGING | Facility: CLINIC | Age: 32
Discharge: HOME | End: 2022-06-27
Attending: INTERNAL MEDICINE | Admitting: INTERNAL MEDICINE
Payer: COMMERCIAL

## 2022-06-27 ENCOUNTER — OFFICE VISIT (OUTPATIENT)
Dept: ORTHOPEDICS | Facility: CLINIC | Age: 32
End: 2022-06-27
Payer: COMMERCIAL

## 2022-06-27 VITALS — WEIGHT: 172 LBS | BODY MASS INDEX: 24.68 KG/M2

## 2022-06-27 DIAGNOSIS — R10.32 LEFT INGUINAL PAIN: ICD-10-CM

## 2022-06-27 DIAGNOSIS — S46.011A ROTATOR CUFF STRAIN, RIGHT, INITIAL ENCOUNTER: Primary | ICD-10-CM

## 2022-06-27 LAB
ATRIAL RATE - MUSE: 84 BPM
DIASTOLIC BLOOD PRESSURE - MUSE: NORMAL MMHG
INTERPRETATION ECG - MUSE: NORMAL
P AXIS - MUSE: 78 DEGREES
PR INTERVAL - MUSE: 218 MS
QRS DURATION - MUSE: 96 MS
QT - MUSE: 380 MS
QTC - MUSE: 449 MS
R AXIS - MUSE: 72 DEGREES
SYSTOLIC BLOOD PRESSURE - MUSE: NORMAL MMHG
T AXIS - MUSE: 60 DEGREES
VENTRICULAR RATE- MUSE: 84 BPM

## 2022-06-27 PROCEDURE — 99203 OFFICE O/P NEW LOW 30 MIN: CPT | Performed by: FAMILY MEDICINE

## 2022-06-27 PROCEDURE — 76705 ECHO EXAM OF ABDOMEN: CPT
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
## 2022-07-19 ENCOUNTER — TELEPHONE (OUTPATIENT)
Dept: SURGERY | Facility: CLINIC | Age: 32
End: 2022-07-19

## 2022-07-19 ENCOUNTER — OFFICE VISIT (OUTPATIENT)
Dept: SURGERY | Facility: CLINIC | Age: 32
End: 2022-07-19
Payer: COMMERCIAL

## 2022-07-19 VITALS
DIASTOLIC BLOOD PRESSURE: 82 MMHG | SYSTOLIC BLOOD PRESSURE: 122 MMHG | HEIGHT: 70 IN | WEIGHT: 172 LBS | RESPIRATION RATE: 16 BRPM | OXYGEN SATURATION: 99 % | HEART RATE: 64 BPM | BODY MASS INDEX: 24.62 KG/M2

## 2022-07-19 DIAGNOSIS — K40.90 NON-RECURRENT INGUINAL HERNIA OF LEFT SIDE WITHOUT OBSTRUCTION OR GANGRENE: Primary | ICD-10-CM

## 2022-07-19 PROCEDURE — 99203 OFFICE O/P NEW LOW 30 MIN: CPT | Performed by: SURGERY

## 2022-07-19 NOTE — LETTER
[facility]    [address], [address]  [place], [place] [address]  Phone [phone]  Fax [phone] [address], [address]  [place] [address]  Phone [phone]  Fax [phone]    [email or web address]   Ten Haro      Covid Home testing:    COVID testing is needed 1-2 days prior to the surgery date.   Please take a picture of the results and bring that with you when you check in for your surgery.    If you test positive, please call our office at [phone].   Following your testing, you will be required to social distance until your surgery.

## 2022-07-19 NOTE — LETTER
[facility]    [address], [address]  [place], [place] [address]  Phone [phone]  Fax [phone] [address], [address]  [place] [address]  Phone [phone]  Fax [phone]    [email or web address]   July 19, 2022    Ten Haro  [address] [place] [place] [address]    We realize with scheduling surgery, one of your first questions is, how much will this cost?  Below we have provided you with the information you will need to receive an estimate for your surgery.    You are scheduled for the following procedure:  ROBOTIC ASSISTED LEFT INGUINAL HERNIA REPAIR   POSSIBLE RIGHT      Surgeon:  Marie Stuart MD      Physician Assistant:  Yes      Please make sure to have your insurance card available at the time of calling.    Surgeon & Physician Assistant charges and facility charges for [facility], [facility] or [facility]:    Consumer Price Line at [phone]   -  It is important to note that there may be a Physician Assistant assisting with your surgery.  Please be sure to mention this when calling for the estimate.      If you prefer, you can also request a price estimate online by completing the secure form at:  [email or web address]    Facility Charges at [facility], [facility] or [facility]:  [facility] at [phone]  [facility] at [phone]  [facility] at [phone] or [phone]    Anesthesiologist Charges:  [facility] at [phone]    [facility] - Nurse Anesthetist Charges:  [facility] at [phone]

## 2022-07-19 NOTE — PROGRESS NOTES
[facility]  New Patient Office Visit    Assessment:   Ten Haro is a 32 year old male with Primary reducible left, possible right inguinal hernia.    Plan:    We will schedule a robotic assisted left possible right inguinal hernia repair at the patient's convenience.   Will need preop H&P    We have had a detailed discussion regarding the nature of inguinal hernias, and that watchful waiting for small asymptomatic hernias is acceptable, but that in general they do tend to enlarge or become symptomatic over time. Surgery, indications, alternatives, risks, benefits, incisions, scarring, anesthesia, recovery, mesh, infection, bleeding, numbness, nerve damage and chronic pain, testicular loss, hernia recurrence, lifting and activity limitations after surgery.  All questions have been answered to the best of my ability.    Recommended time off work postop:  2 wks  Recommended time off lifting 20 lb:   2 wks  He has been given literature to review.       He is seen in consultation for inguinal hernia, at the request of Allan Linda MD.                      HPI:  Ten Haro is a 32 year old male who presents for evaluation of pain in the left groin(s).   He first noticed it 6 months ago. He describes an inciting event:  Yes -standing up getting off the couch and noted sharp pain in the left groin radiating down to the testicle.  In the last 6 months he has noticed this intermittent pain that worsens every once in well and is exacerbated by working.  He details cars.  More recently has noticed a constant ache which is mildly bothersome.  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    No current outpatient medications on file.     No current facility-administered medications for this visit.        Past Surgical History:  Laparoscopic  "appendectomy approximately 2014     Social History:  Social History     Tobacco Use     Smoking status: Never Smoker     Smokeless tobacco: Never Used   Substance Use Topics     Alcohol use: Yes     Comment: 1 drink per month     Drug use: Never        Family History:  Family History   Problem Relation Age of Onset     Hypertension Mother      Asthma Sister        PE:    Vitals: /82   Pulse 64   Resp 16   Ht 1.778 m (5' 10\")   Wt 78 kg (172 lb)   SpO2 99%   BMI 24.68 kg/m    BMI= Body mass index is 24.68 kg/m .  General- Well-developed, thin, well-nourished, patient able to get up on table without difficulty.  Abdomen- flat, nondistended, scars consistent with surgical history  Hernia- Upon standing there is not an obvious bulge in either groin  Palpating with a finger at the external ring, a left inguinal hernia is present with valsalva              Palpating with a finger at the external ring, a right inguinal hernia is possibly present with valsalv      This note may have been created using voice recognition software. Undetected word substitutions or other errors may have occurred.       30 minutes spent on the date of the encounter doing chart review, history and exam, documentation and further activities as noted above      Corrie Colon MD  07/19/22 8:37 AM     Please route or send letter to:  Referring Provider      "

## 2022-07-19 NOTE — TELEPHONE ENCOUNTER
Type of surgery: ROBOTIC ASSISTED LEFT INGUINAL HERNIA REPAIR   POSSIBLE RIGHT     Location of surgery: [facility]  Date and time of surgery: 8/18/2022 @ 7:50 AM   Surgeon: Corrie Colon MD    Pre-Op Appt Date: PATIENT TO SCHEDULE    Post-Op Appt Date: PATIENT TO SCHEDULE     Packet sent out: Yes  Pre-cert/Authorization completed:  Not Applicable  Date: 7/19/2022       ROBOTIC ASSISTED LEFT INGUINAL HERNIA REPAIR   POSSIBLE RIGHT     GENERAL PATIENT  INST TO HAVE H&P WITH DR OJEDA  90 MIN REQ PA ASSIST JLS NMS

## 2022-07-19 NOTE — LETTER
"2022    RE: Ten Haro, : 1990      [facility]  New Patient Office Visit     Assessment:   Ten Haro is a 32 year old male with Primary reducible left, possible right inguinal hernia.     Plan:    We will schedule a robotic assisted left possible right inguinal hernia repair at the patient's convenience.   Will need preop H&P     We have had a detailed discussion regarding the nature of inguinal hernias, and that watchful waiting for small asymptomatic hernias is acceptable, but that in general they do tend to enlarge or become symptomatic over time. Surgery, indications, alternatives, risks, benefits, incisions, scarring, anesthesia, recovery, mesh, infection, bleeding, numbness, nerve damage and chronic pain, testicular loss, hernia recurrence, lifting and activity limitations after surgery.  All questions have been answered to the best of my ability.     Recommended time off work postop:  2 wks  Recommended time off lifting 20 lb:   2 wks  He has been given literature to review.       He is seen in consultation for inguinal hernia, at the request of Allan Linda MD.      HPI:  Ten Haro is a 32 year old male who presents for evaluation of pain in the left groin(s).   He first noticed it 6 months ago. He describes an inciting event:  Yes -standing up getting off the couch and noted sharp pain in the left groin radiating down to the testicle.  In the last 6 months he has noticed this intermittent pain that worsens every once in well and is exacerbated by working.  He details cars.  More recently has noticed a constant ache which is mildly bothersome.  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  General- Well-developed, thin, well-nourished, patient able to get up on table without difficulty.  Abdomen- flat, nondistended, scars consistent with surgical history  Hernia- Upon standing there is not an obvious bulge in either groin  Palpating with a finger at the external ring, a left inguinal hernia is present with valsalva              Palpating with a finger at the external ring, a right inguinal hernia is possibly present with valsalv          Corrie Colon MD

## 2022-08-16 ENCOUNTER — OFFICE VISIT (OUTPATIENT)
Dept: FAMILY MEDICINE | Facility: CLINIC | Age: 32
End: 2022-08-16
Payer: COMMERCIAL

## 2022-08-16 VITALS
HEART RATE: 68 BPM | HEIGHT: 70 IN | SYSTOLIC BLOOD PRESSURE: 126 MMHG | DIASTOLIC BLOOD PRESSURE: 82 MMHG | OXYGEN SATURATION: 99 % | BODY MASS INDEX: 24.22 KG/M2 | TEMPERATURE: 98.6 F | WEIGHT: 169.2 LBS | RESPIRATION RATE: 16 BRPM

## 2022-08-16 DIAGNOSIS — K40.90 LEFT INGUINAL HERNIA: ICD-10-CM

## 2022-08-16 DIAGNOSIS — Z01.818 PRE-OP EXAM: Primary | ICD-10-CM

## 2022-08-16 PROCEDURE — 99214 OFFICE O/P EST MOD 30 MIN: CPT | Performed by: FAMILY MEDICINE

## 2022-08-16 SDOH — ECONOMIC STABILITY: INCOME INSECURITY: IN THE LAST 12 MONTHS, WAS THERE A TIME WHEN YOU WERE NOT ABLE TO PAY THE MORTGAGE OR RENT ON TIME?: NO

## 2022-08-16 SDOH — HEALTH STABILITY: PHYSICAL HEALTH: ON AVERAGE, HOW MANY DAYS PER WEEK DO YOU ENGAGE IN MODERATE TO STRENUOUS EXERCISE (LIKE A BRISK WALK)?: 5 DAYS

## 2022-08-16 SDOH — ECONOMIC STABILITY: TRANSPORTATION INSECURITY
IN THE PAST 12 MONTHS, HAS LACK OF TRANSPORTATION KEPT YOU FROM MEETINGS, WORK, OR FROM GETTING THINGS NEEDED FOR DAILY LIVING?: NO

## 2022-08-16 SDOH — ECONOMIC STABILITY: FOOD INSECURITY: WITHIN THE PAST 12 MONTHS, THE FOOD YOU BOUGHT JUST DIDN'T LAST AND YOU DIDN'T HAVE MONEY TO GET MORE.: NEVER TRUE

## 2022-08-16 SDOH — ECONOMIC STABILITY: FOOD INSECURITY: WITHIN THE PAST 12 MONTHS, YOU WORRIED THAT YOUR FOOD WOULD RUN OUT BEFORE YOU GOT MONEY TO BUY MORE.: NEVER TRUE

## 2022-08-16 SDOH — HEALTH STABILITY: PHYSICAL HEALTH: ON AVERAGE, HOW MANY MINUTES DO YOU ENGAGE IN EXERCISE AT THIS LEVEL?: 30 MIN

## 2022-08-16 SDOH — ECONOMIC STABILITY: TRANSPORTATION INSECURITY
IN THE PAST 12 MONTHS, HAS THE LACK OF TRANSPORTATION KEPT YOU FROM MEDICAL APPOINTMENTS OR FROM GETTING MEDICATIONS?: NO

## 2022-08-16 SDOH — ECONOMIC STABILITY: INCOME INSECURITY: HOW HARD IS IT FOR YOU TO PAY FOR THE VERY BASICS LIKE FOOD, HOUSING, MEDICAL CARE, AND HEATING?: NOT VERY HARD

## 2022-08-16 ASSESSMENT — LIFESTYLE VARIABLES
AUDIT-C TOTAL SCORE: 1
HOW OFTEN DO YOU HAVE SIX OR MORE DRINKS ON ONE OCCASION: NEVER
HOW OFTEN DO YOU HAVE A DRINK CONTAINING ALCOHOL: MONTHLY OR LESS
HOW MANY STANDARD DRINKS CONTAINING ALCOHOL DO YOU HAVE ON A TYPICAL DAY: 1 OR 2
SKIP TO QUESTIONS 9-10: 1

## 2022-08-16 ASSESSMENT — SOCIAL DETERMINANTS OF HEALTH (SDOH)
IN A TYPICAL WEEK, HOW MANY TIMES DO YOU TALK ON THE PHONE WITH FAMILY, FRIENDS, OR NEIGHBORS?: THREE TIMES A WEEK
DO YOU BELONG TO ANY CLUBS OR ORGANIZATIONS SUCH AS CHURCH GROUPS UNIONS, FRATERNAL OR ATHLETIC GROUPS, OR SCHOOL GROUPS?: NO
HOW OFTEN DO YOU GET TOGETHER WITH FRIENDS OR RELATIVES?: MORE THAN THREE TIMES A WEEK
HOW OFTEN DO YOU ATTEND CHURCH OR RELIGIOUS SERVICES?: NEVER

## 2022-08-16 NOTE — PROGRESS NOTES
[facility]  [address] [address]  Phone: [phone]  Primary Provider: Jen Perez      PREOPERATIVE EVALUATION:  Today's date: 8/16/2022    Ten Haro is a 32 year old male who presents for a preoperative evaluation.    Surgical Information:  Surgery/Procedure: Herniorrhaphy  Surgery Location: [facility]  Surgeon: Isaiah  Surgery Date: 8/18/2022  Time of Surgery: pt is unsure as of now, has to be there at 5:30 am  Where patient plans to recover: At home with family  Fax number for surgical facility: Note does not need to be faxed, will be available electronically in Epic.    Type of Anesthesia Anticipated: General    Assessment & Plan     The proposed surgical procedure is considered INTERMEDIATE risk.    Pre-op exam    Left inguinal hernia    Risks and Recommendations:  The patient has the following additional risks and recommendations for perioperative complications:   - No identified additional risk factors other than previously addressed    Medication Instructions:  Patient is on no chronic medications    RECOMMENDATION:  APPROVAL GIVEN to proceed with proposed procedure, without further diagnostic evaluation.      Subjective     HPI related to upcoming procedure: left inguinal hernia, symptomatic    Preop Questions 8/16/2022   1. Have you ever had a heart attack or stroke? No   2. Have you ever had surgery on your heart or blood vessels, such as a stent placement, a coronary artery bypass, or surgery on an artery in your head, neck, heart, or legs? No   3. Do you have chest pain with activity? No   4. Do you have a history of  heart failure? No   5. Do you currently have a cold, bronchitis or symptoms of other infection? No   6. Do you have a cough, shortness of breath, or wheezing? No   7. Do you or anyone in your family have previous history of blood clots? YES - dad, during cancer treatment   8. Do you or does anyone in  your family have a serious bleeding problem such as prolonged bleeding following surgeries or cuts? No   9. Have you ever had problems with anemia or been told to take iron pills? No   10. Have you had any abnormal blood loss such as black, tarry or bloody stools? No   11. Have you ever had a blood transfusion? No   12. Are you willing to have a blood transfusion if it is medically needed before, during, or after your surgery? Yes   13. Have you or any of your relatives ever had problems with anesthesia? No   14. Do you have sleep apnea, excessive snoring or daytime drowsiness? No   15. Do you have any artifical heart valves or other implanted medical devices like a pacemaker, defibrillator, or continuous glucose monitor? No   16. Do you have artificial joints? No   17. Are you allergic to latex? No       Health Care Directive:  Patient does not have a Health Care Directive or Living Will: Discussed advance care planning with patient; information given to patient to review.    Preoperative Review of :   reviewed - no record of controlled substances prescribed.      Status of Chronic Conditions:  See problem list for active medical problems.  Problems all longstanding and stable, except as noted/documented.  See ROS for pertinent symptoms related to these conditions.      Review of Systems  Constitutional, neuro, ENT, endocrine, pulmonary, cardiac, gastrointestinal, genitourinary, musculoskeletal, integument and psychiatric systems are negative, except as otherwise noted.    Patient Active Problem List    Diagnosis Date Noted     History of sciatica 05/20/2019     Priority: Medium     Currently is asymptomatic.        History reviewed. No pertinent past medical history.  Past Surgical History:   Procedure Laterality Date     APPENDECTOMY      2002     No current outpatient medications on file.       No Known Allergies     Social History     Tobacco Use     Smoking status: Never Smoker     Smokeless tobacco:  "Never Used   Substance Use Topics     Alcohol use: Yes     Comment: 1 drink per month     Family History   Problem Relation Age of Onset     Hypertension Mother      Asthma Sister      History   Drug Use Unknown         Objective     /82   Pulse 68   Temp 98.6  F (37  C) (Oral)   Resp 16   Ht 1.778 m (5' 10\")   Wt 76.7 kg (169 lb 3.2 oz)   SpO2 99%   BMI 24.28 kg/m      Physical Exam    GENERAL APPEARANCE: healthy, alert and no distress     EYES: EOMI,  PERRL     HENT: ear canals and TM's normal and nose and mouth without ulcers or lesions     NECK: no adenopathy, no asymmetry, masses, or scars and thyroid normal to palpation     RESP: lungs clear to auscultation - no rales, rhonchi or wheezes     CV: regular rates and rhythm, normal S1 S2, no S3 or S4 and no murmur, click or rub     ABDOMEN:  soft, nontender, no HSM or masses and bowel sounds normal     MS: extremities normal- no gross deformities noted, no evidence of inflammation in joints, FROM in all extremities.     SKIN: no suspicious lesions or rashes     NEURO: Normal strength and tone, sensory exam grossly normal, mentation intact and speech normal     PSYCH: mentation appears normal. and affect normal/bright     LYMPHATICS: No cervical adenopathy    Recent Labs   Lab Test 06/24/22  1927   HGB 14.1      INR 1.09      POTASSIUM 3.8   CR 0.88        Diagnostics:  No labs were ordered during this visit.   No EKG required for low risk surgery (cataract, skin procedure, breast biopsy, etc).    Revised Cardiac Risk Index (RCRI):  The patient has the following serious cardiovascular risks for perioperative complications:   - No serious cardiac risks = 0 points     RCRI Interpretation: 0 points: Class I (very low risk - 0.4% complication rate)           Signed Electronically by: Padmaja Laws MD  Copy of this evaluation report is provided to requesting physician.    "

## 2022-08-18 ENCOUNTER — ANESTHESIA (OUTPATIENT)
Dept: SURGERY | Facility: CLINIC | Age: 32
End: 2022-08-18
Payer: COMMERCIAL

## 2022-08-18 ENCOUNTER — HOSPITAL ENCOUNTER (OUTPATIENT)
Facility: CLINIC | Age: 32
Discharge: HOME | End: 2022-08-18
Attending: SURGERY | Admitting: SURGERY
Payer: COMMERCIAL

## 2022-08-18 ENCOUNTER — OFFICE VISIT (OUTPATIENT)
Dept: SURGERY | Facility: PHYSICIAN GROUP | Age: 32
End: 2022-08-18

## 2022-08-18 ENCOUNTER — ANESTHESIA EVENT (OUTPATIENT)
Dept: SURGERY | Facility: CLINIC | Age: 32
End: 2022-08-18
Payer: COMMERCIAL

## 2022-08-18 VITALS
RESPIRATION RATE: 16 BRPM | HEIGHT: 70 IN | SYSTOLIC BLOOD PRESSURE: 99 MMHG | TEMPERATURE: 96.9 F | BODY MASS INDEX: 24.05 KG/M2 | HEART RATE: 61 BPM | DIASTOLIC BLOOD PRESSURE: 72 MMHG | WEIGHT: 168 LBS | OXYGEN SATURATION: 93 %

## 2022-08-18 DIAGNOSIS — Z53.9 ERRONEOUS ENCOUNTER--DISREGARD: Primary | ICD-10-CM

## 2022-08-18 DIAGNOSIS — K40.90 NON-RECURRENT INGUINAL HERNIA OF LEFT SIDE WITHOUT OBSTRUCTION OR GANGRENE: ICD-10-CM

## 2022-08-18 PROCEDURE — 250N000009 HC RX 250: Performed by: ANESTHESIOLOGY

## 2022-08-18 PROCEDURE — 250N000009 HC RX 250: Performed by: NURSE ANESTHETIST, CERTIFIED REGISTERED

## 2022-08-18 PROCEDURE — 258N000003 HC RX IP 258 OP 636: Performed by: ANESTHESIOLOGY

## 2022-08-18 PROCEDURE — 999N000141 HC STATISTIC PRE-PROCEDURE NURSING ASSESSMENT: Performed by: SURGERY

## 2022-08-18 PROCEDURE — 49650 LAP ING HERNIA REPAIR INIT: CPT | Mod: LT | Performed by: PHYSICIAN ASSISTANT

## 2022-08-18 PROCEDURE — 360N000080 HC SURGERY LEVEL 7, PER MIN: Performed by: SURGERY

## 2022-08-18 PROCEDURE — 710N000009 HC RECOVERY PHASE 1, LEVEL 1, PER MIN: Performed by: SURGERY

## 2022-08-18 PROCEDURE — 710N000012 HC RECOVERY PHASE 2, PER MINUTE: Performed by: SURGERY

## 2022-08-18 PROCEDURE — 250N000009 HC RX 250: Performed by: SURGERY

## 2022-08-18 PROCEDURE — 49650 LAP ING HERNIA REPAIR INIT: CPT | Mod: LT | Performed by: SURGERY

## 2022-08-18 PROCEDURE — C1781 MESH (IMPLANTABLE): HCPCS | Performed by: SURGERY

## 2022-08-18 PROCEDURE — 250N000011 HC RX IP 250 OP 636: Performed by: ANESTHESIOLOGY

## 2022-08-18 PROCEDURE — 250N000011 HC RX IP 250 OP 636: Performed by: NURSE ANESTHETIST, CERTIFIED REGISTERED

## 2022-08-18 PROCEDURE — 272N000001 HC OR GENERAL SUPPLY STERILE: Performed by: SURGERY

## 2022-08-18 PROCEDURE — 370N000017 HC ANESTHESIA TECHNICAL FEE, PER MIN: Performed by: SURGERY

## 2022-08-18 PROCEDURE — 250N000011 HC RX IP 250 OP 636: Performed by: SURGERY

## 2022-08-18 DEVICE — MESH PROGRIP LAPAROSCOPIC 5.9X3.9" PARIETEX SELF-FIX LPG1510: Type: IMPLANTABLE DEVICE | Site: INGUINAL | Status: FUNCTIONAL

## 2022-08-18 RX ORDER — SODIUM CHLORIDE, SODIUM LACTATE, POTASSIUM CHLORIDE, CALCIUM CHLORIDE 600; 310; 30; 20 MG/100ML; MG/100ML; MG/100ML; MG/100ML
INJECTION, SOLUTION INTRAVENOUS CONTINUOUS
Status: DISCONTINUED | OUTPATIENT
Start: 2022-08-18 | End: 2022-08-18 | Stop reason: HOSPADM

## 2022-08-18 RX ORDER — MEPERIDINE HYDROCHLORIDE 25 MG/ML
12.5 INJECTION INTRAMUSCULAR; INTRAVENOUS; SUBCUTANEOUS
Status: DISCONTINUED | OUTPATIENT
Start: 2022-08-18 | End: 2022-08-18 | Stop reason: HOSPADM

## 2022-08-18 RX ORDER — ONDANSETRON 2 MG/ML
4 INJECTION INTRAMUSCULAR; INTRAVENOUS EVERY 30 MIN PRN
Status: DISCONTINUED | OUTPATIENT
Start: 2022-08-18 | End: 2022-08-18 | Stop reason: HOSPADM

## 2022-08-18 RX ORDER — GLYCOPYRROLATE 0.2 MG/ML
INJECTION, SOLUTION INTRAMUSCULAR; INTRAVENOUS PRN
Status: DISCONTINUED | OUTPATIENT
Start: 2022-08-18 | End: 2022-08-18

## 2022-08-18 RX ORDER — DEXAMETHASONE SODIUM PHOSPHATE 4 MG/ML
INJECTION, SOLUTION INTRA-ARTICULAR; INTRALESIONAL; INTRAMUSCULAR; INTRAVENOUS; SOFT TISSUE PRN
Status: DISCONTINUED | OUTPATIENT
Start: 2022-08-18 | End: 2022-08-18

## 2022-08-18 RX ORDER — NEOSTIGMINE METHYLSULFATE 1 MG/ML
VIAL (ML) INJECTION PRN
Status: DISCONTINUED | OUTPATIENT
Start: 2022-08-18 | End: 2022-08-18

## 2022-08-18 RX ORDER — LIDOCAINE 40 MG/G
CREAM TOPICAL
Status: DISCONTINUED | OUTPATIENT
Start: 2022-08-18 | End: 2022-08-18 | Stop reason: HOSPADM

## 2022-08-18 RX ORDER — OXYCODONE HYDROCHLORIDE 5 MG/1
5 TABLET ORAL EVERY 4 HOURS PRN
Status: DISCONTINUED | OUTPATIENT
Start: 2022-08-18 | End: 2022-08-18 | Stop reason: HOSPADM

## 2022-08-18 RX ORDER — ONDANSETRON 4 MG/1
4 TABLET, ORALLY DISINTEGRATING ORAL EVERY 30 MIN PRN
Status: DISCONTINUED | OUTPATIENT
Start: 2022-08-18 | End: 2022-08-18 | Stop reason: HOSPADM

## 2022-08-18 RX ORDER — CEFAZOLIN SODIUM/WATER 2 G/20 ML
2 SYRINGE (ML) INTRAVENOUS
Status: COMPLETED | OUTPATIENT
Start: 2022-08-18 | End: 2022-08-18

## 2022-08-18 RX ORDER — CEFAZOLIN SODIUM/WATER 2 G/20 ML
2 SYRINGE (ML) INTRAVENOUS SEE ADMIN INSTRUCTIONS
Status: DISCONTINUED | OUTPATIENT
Start: 2022-08-18 | End: 2022-08-18 | Stop reason: HOSPADM

## 2022-08-18 RX ORDER — FENTANYL CITRATE 50 UG/ML
25 INJECTION, SOLUTION INTRAMUSCULAR; INTRAVENOUS EVERY 5 MIN PRN
Status: DISCONTINUED | OUTPATIENT
Start: 2022-08-18 | End: 2022-08-18 | Stop reason: HOSPADM

## 2022-08-18 RX ORDER — NALOXONE HYDROCHLORIDE 0.4 MG/ML
INJECTION, SOLUTION INTRAMUSCULAR; INTRAVENOUS; SUBCUTANEOUS PRN
Status: DISCONTINUED | OUTPATIENT
Start: 2022-08-18 | End: 2022-08-18

## 2022-08-18 RX ORDER — BUPIVACAINE HYDROCHLORIDE AND EPINEPHRINE 5; 5 MG/ML; UG/ML
INJECTION, SOLUTION PERINEURAL PRN
Status: DISCONTINUED | OUTPATIENT
Start: 2022-08-18 | End: 2022-08-18 | Stop reason: HOSPADM

## 2022-08-18 RX ORDER — ONDANSETRON 2 MG/ML
INJECTION INTRAMUSCULAR; INTRAVENOUS PRN
Status: DISCONTINUED | OUTPATIENT
Start: 2022-08-18 | End: 2022-08-18

## 2022-08-18 RX ORDER — FENTANYL CITRATE 50 UG/ML
INJECTION, SOLUTION INTRAMUSCULAR; INTRAVENOUS PRN
Status: DISCONTINUED | OUTPATIENT
Start: 2022-08-18 | End: 2022-08-18

## 2022-08-18 RX ORDER — KETOROLAC TROMETHAMINE 30 MG/ML
INJECTION, SOLUTION INTRAMUSCULAR; INTRAVENOUS PRN
Status: DISCONTINUED | OUTPATIENT
Start: 2022-08-18 | End: 2022-08-18

## 2022-08-18 RX ORDER — HYDROMORPHONE HCL IN WATER/PF 6 MG/30 ML
0.2 PATIENT CONTROLLED ANALGESIA SYRINGE INTRAVENOUS EVERY 5 MIN PRN
Status: DISCONTINUED | OUTPATIENT
Start: 2022-08-18 | End: 2022-08-18 | Stop reason: HOSPADM

## 2022-08-18 RX ORDER — PROPOFOL 10 MG/ML
INJECTION, EMULSION INTRAVENOUS CONTINUOUS PRN
Status: DISCONTINUED | OUTPATIENT
Start: 2022-08-18 | End: 2022-08-18

## 2022-08-18 RX ORDER — FENTANYL CITRATE 50 UG/ML
25 INJECTION, SOLUTION INTRAMUSCULAR; INTRAVENOUS
Status: DISCONTINUED | OUTPATIENT
Start: 2022-08-18 | End: 2022-08-18 | Stop reason: HOSPADM

## 2022-08-18 RX ADMIN — GLYCOPYRROLATE 0.6 MG: 0.2 INJECTION, SOLUTION INTRAMUSCULAR; INTRAVENOUS at 08:49

## 2022-08-18 RX ADMIN — NALOXONE HYDROCHLORIDE 40 MCG: 0.4 INJECTION, SOLUTION INTRAMUSCULAR; INTRAVENOUS; SUBCUTANEOUS at 09:06

## 2022-08-18 RX ADMIN — LIDOCAINE HYDROCHLORIDE 40 MG: 10 INJECTION, SOLUTION EPIDURAL; INFILTRATION; INTRACAUDAL; PERINEURAL at 07:44

## 2022-08-18 RX ADMIN — NALOXONE HYDROCHLORIDE 40 MCG: 0.4 INJECTION, SOLUTION INTRAMUSCULAR; INTRAVENOUS; SUBCUTANEOUS at 09:08

## 2022-08-18 RX ADMIN — KETOROLAC TROMETHAMINE 30 MG: 30 INJECTION, SOLUTION INTRAMUSCULAR at 08:52

## 2022-08-18 RX ADMIN — GLYCOPYRROLATE 0.2 MG: 0.2 INJECTION, SOLUTION INTRAMUSCULAR; INTRAVENOUS at 07:44

## 2022-08-18 RX ADMIN — PROPOFOL 35 MCG/KG/MIN: 10 INJECTION, EMULSION INTRAVENOUS at 07:53

## 2022-08-18 RX ADMIN — FENTANYL CITRATE 25 MCG: 50 INJECTION, SOLUTION INTRAMUSCULAR; INTRAVENOUS at 09:41

## 2022-08-18 RX ADMIN — Medication 2 G: at 07:39

## 2022-08-18 RX ADMIN — PROPOFOL 200 MG: 10 INJECTION, EMULSION INTRAVENOUS at 07:44

## 2022-08-18 RX ADMIN — NEOSTIGMINE METHYLSULFATE 3 MG: 1 INJECTION, SOLUTION INTRAVENOUS at 08:50

## 2022-08-18 RX ADMIN — DEXAMETHASONE SODIUM PHOSPHATE 8 MG: 4 INJECTION, SOLUTION INTRA-ARTICULAR; INTRALESIONAL; INTRAMUSCULAR; INTRAVENOUS; SOFT TISSUE at 07:45

## 2022-08-18 RX ADMIN — ROCURONIUM BROMIDE 50 MG: 50 INJECTION, SOLUTION INTRAVENOUS at 07:45

## 2022-08-18 RX ADMIN — FENTANYL CITRATE 25 MCG: 50 INJECTION, SOLUTION INTRAMUSCULAR; INTRAVENOUS at 09:46

## 2022-08-18 RX ADMIN — HYDROMORPHONE HYDROCHLORIDE 1 MG: 1 INJECTION, SOLUTION INTRAMUSCULAR; INTRAVENOUS; SUBCUTANEOUS at 07:57

## 2022-08-18 RX ADMIN — FENTANYL CITRATE 100 MCG: 50 INJECTION, SOLUTION INTRAMUSCULAR; INTRAVENOUS at 07:44

## 2022-08-18 RX ADMIN — SODIUM CHLORIDE, POTASSIUM CHLORIDE, SODIUM LACTATE AND CALCIUM CHLORIDE: 600; 310; 30; 20 INJECTION, SOLUTION INTRAVENOUS at 09:01

## 2022-08-18 RX ADMIN — ONDANSETRON HYDROCHLORIDE 4 MG: 2 INJECTION, SOLUTION INTRAVENOUS at 08:50

## 2022-08-18 RX ADMIN — SODIUM CHLORIDE, POTASSIUM CHLORIDE, SODIUM LACTATE AND CALCIUM CHLORIDE: 600; 310; 30; 20 INJECTION, SOLUTION INTRAVENOUS at 07:05

## 2022-08-18 RX ADMIN — MIDAZOLAM 2 MG: 1 INJECTION INTRAMUSCULAR; INTRAVENOUS at 07:40

## 2022-08-18 RX ADMIN — ONDANSETRON 4 MG: 2 INJECTION INTRAMUSCULAR; INTRAVENOUS at 11:03

## 2022-08-18 ASSESSMENT — ACTIVITIES OF DAILY LIVING (ADL)
ADLS_ACUITY_SCORE: 35

## 2022-08-18 NOTE — DISCHARGE INSTRUCTIONS
HOME CARE FOLLOWING INGUINAL/FEMORAL HERNIA REPAIR  JUANI Kumar, LEIGHANN Brantley, MAURICE Winter, PRETTY Colon    DIET:  Start with liquids and gradually resume your regular diet as tolerated.  Drink plenty of fluids.  While taking pain medications, consider use of a stool softener, increase your fiber in your diet, or add a fiber supplement (like Metamucil, Citrucel) to help prevent constipation - a possible side effect of pain medications.    NAUSEA:  If nauseated from the anesthetic/pain meds; rest in bed, get up cautiously with assistance, and drink clear liquids (juice, tea, broth).    ACTIVITY:  Light Activity -- you may immediately be up and about as tolerated.  Walking is encouraged, increase as tolerated.  Driving/Light Work-- when comfortable and off narcotic pain medications.  Strenuous Work/Activity -- limit lifting to 20 pounds for 3 weeks.  Active Sports (running, biking, etc.) -- cautiously resume after 4 weeks.    INCISIONAL CARE:  If you have a dressing in place, keep clean and dry for 48 hours; you may replace the gauze if it becomes soiled.  After 48 hours you may remove the dressing and shower.  Do not submerse incision in water for 1 week.  If you have a Dermabond dressing (a type of skin glue), you may shower immediately.  Sutures will absorb and need not be removed.  If present, leave the steri-strips (white paper tapes) in place for 14 days after surgery.  If present, leave Dermabond glue in place until it wears/flakes off.  Do not apply lotions, creams, or ointments to incisions.  Expect a variable amount of swelling/black and blue discoloration that may involve the penis/scrotum or labia.  Some numbness around the incision is common.  A lump/ridge under the incision is normal and will gradually resolve.    DISCOMFORT:  Local anesthetic placed at surgery should provide relief for 4-8 hours.  Begin taking pain pills before discomfort is severe.  Take the pain medication  with some food, when possible, to minimize side effects.  Intermittent use of ice packs to the hernia repair site may help during the first 1-3 weeks after surgery.  Expect gradual improvement.    Recommend the following over the counter medications:  - Ibuprofen (motrin) 600mg every 6 hours (max 2,400mg per day)   - Tylenol (acetaminophen) 500-1000mg every 6 hours (max 4,000mg per day)    -FOLLOW-UP AFTER SURGERY:  -Our office will contact you approximately 2-3 weeks after surgery to check on your progress and answer any questions you may have.  If you are doing well, you will not need to return for an office appointment.  If any concerns are identified over the phone, we will help you make an appointment to see a provider.    -If you have not received a phone call, have any questions or concerns, or would like to be seen, please call us at [phone].  We are located at: [address], [address]; [place] [address]    -CONTACT US IF THE FOLLOWING DEVELOPS:   1. A fever that is above 101     2. Increased redness, warmth, drainage, bleeding, or swelling.   3. Pain that is not relieved by rest/ice and your prescription.   4.  Increasing pain after 48 hours.   5. Drainage that is thick, cloudy, yellow, green or white.   6. Any other questions or concerns.      FREQUENTLY ASKED QUESTIONS:    Q:  How should my incision look?    A:  Normally your incision will appear slightly swollen with light redness directly along the incision itself as it heals.  It may feel like a bump or ridge as the healing/scarring happens, and over time (3-4 months) this bump or ridge feeling should slowly go away.  In general, clear or pink watery drainage can be normal at first as your incision heals, but should decrease over time.    Q:  How do I know if my incision is infected?  A:  Look at your incision for signs of infection, like redness around the incision spreading to surrounding skin, or drainage of cloudy or foul-smelling  drainage.  If you feel warm, check your temperature to see if you are running a fever.    **If any of these things occur, please notify the nurse at our office.  We may need you to come into the office for an incision check.      Q:  How do I take care of my incision?  A:  If you have a dressing in place - Starting the day after surgery, replace the dressing 1-2 times a day until there is no further drainage from the incision.  At that time, a dressing is no longer needed.  Try to minimize tape on the skin if irritation is occurring at the tape sites.  If you have significant irritation from tape on the skin, please call the office to discuss other method of dressing your incision.    Small pieces of tape called  steri-strips  may be present directly overlying your incision; these may be removed 10 days after surgery unless otherwise specified by your surgeon.  If these tapes start to loosen at the ends, you may trim them back until they fall off or are removed.    A:  If you had  Dermabond  tissue glue used as a dressing (this causes your incision to look shiny with a clear covering over it) - This type of dressing wears off with time and does not require more dressings over the top unless it is draining around the glue as it wears off.  Do not apply ointments or lotions over the incisions until the glue has completely worn off.    Q:  There is a piece of tape or a sticky  lead  still on my skin.  Can I remove this?  A:  Sometimes the sticky  leads  used for monitoring during surgery or for evaluation in the emergency department are not all removed while you are in the hospital.  These sometimes have a tab or metal dot on them.  You can easily remove these on your own, like taking off a band-aid.  If there is a gel substance under the  lead , simply wipe/clean it off with a washcloth or paper towel.      Q:  What can I do to minimize constipation (very hard stools, or lack of stools)?  A:  Stay well hydrated.   Increase your dietary fiber intake or take a fiber supplement -with plenty of water.  Walk around frequently.  You may consider an over-the-counter stool-softener.  Your Pharmacist can assist you with choosing one that is stocked at your pharmacy.  Constipation is also one of the most common side effects of pain medication.  If you are using pain medication, be pro-active and try to PREVENT problems with constipation by taking the steps above BEFORE constipation becomes a problem.    Q:  What do I do if I need more pain medications?  A:  Call the office to receive refills.  Be aware that certain pain meds cannot be called into a pharmacy and actually require a paper prescription.  A change may be made in your pain med as you progress thru your recovery period or if you have side effects to certain meds.    --Pain meds are NOT refilled after 5pm on weekdays, and NOT AT ALL on the weekends, so please look ahead to prevent problems.    Q:  Why am I having a hard time sleeping now that I am at home?  A:  Many medications you receive while you are in the hospital can impact your sleep for a number of days after your surgery/hospitalization.  Decreased level of activity and naps during the day may also make sleeping at night difficult.  Try to minimize day-time naps, and get up frequently during the day to walk around your home during your recovery time.  Sleep aides may be of some help, but are not recommended for long-term use.      Q:  I am having some back discomfort.  What should I do?  A:  This may be related to certain positioning that was required for your surgery, extended periods of time in bed, or other changes in your overall activity level.  You may try ice, heat, acetaminophen, or ibuprofen to treat this temporarily.  Note that many pain medications have acetaminophen in them and would state this on the prescription bottle.  Be sure not to exceed the maximum of 4000mg per day of acetaminophen.     **If the  pain you are having does not resolve, is severe, or is a flare of back pain you have had on other occasions prior to surgery, please contact your primary physician for further recommendations or for an appointment to be examined at their office.    Q:  Why am I having headaches?  A:  Headaches can be caused by many things:  caffeine withdrawal, use of pain meds, dehydration, high blood pressure, lack of sleep, over-activity/exhaustion, flare-up of usual migraine headaches.  If you feel this is related to muscle tension (a band-like feeling around the head, or a pressure at the low-back of the head) you may try ice or heat to this area.  You may need to drink more fluids (try electrolyte drink like Gatorade), rest, or take your usual migraine medications.   **If your headaches do not resolve, worsen, are accompanied by other symptoms, or if your blood pressure is high, please call your primary physician for recommendation and/or examination.    Q:  I am unable to urinate.  What do I do?  A:  A small percentage of people can have difficulty urinating initially after surgery.  This includes being able to urinate only a very small amount at a time and feeling discomfort or pressure in the very low abdomen.  This is called  urinary retention , and is actually an urgent situation.  Proceed to your nearest Emergency department for evaluation (not an Urgent Care Center).  Sometimes the bladder does not work correctly after certain medications you receive during surgery, or related to certain procedures.  You may need to have a catheter placed until your bladder recovers.  When planning to go to an Emergency department, it may help to call the ER to let them know you are coming in for this problem after a surgery.  This may help you get in quicker to be evaluated.  **If you have symptoms of a urinary tract infection, please contact your primary physician for the proper evaluation and treatment.        If you have other  questions, please call the office Monday thru Friday between 8am and 4:30pm to discuss with the nurse or physician assistant.  [phone]    There is a surgeon ON CALL on weekday evenings and over the weekend in case of urgent need only, and may be contacted at the same number.    If you are having an emergency, call 911 or proceed to your nearest emergency department.      GENERAL ANESTHESIA OR SEDATION ADULT DISCHARGE INSTRUCTIONS   SPECIAL PRECAUTIONS FOR 24 HOURS AFTER SURGERY    IT IS NOT UNUSUAL TO FEEL LIGHT-HEADED OR FAINT, UP TO 24 HOURS AFTER SURGERY OR WHILE TAKING PAIN MEDICATION.  IF YOU HAVE THESE SYMPTOMS; SIT FOR A FEW MINUTES BEFORE STANDING AND HAVE SOMEONE ASSIST YOU WHEN YOU GET UP TO WALK OR USE THE BATHROOM.    YOU SHOULD REST AND RELAX FOR THE NEXT 24 HOURS AND YOU MUST MAKE ARRANGEMENTS TO HAVE SOMEONE STAY WITH YOU FOR AT LEAST 24 HOURS AFTER YOUR DISCHARGE.  AVOID HAZARDOUS AND STRENUOUS ACTIVITIES.  DO NOT MAKE IMPORTANT DECISIONS FOR 24 HOURS.    DO NOT DRIVE ANY VEHICLE OR OPERATE MECHANICAL EQUIPMENT FOR 24 HOURS FOLLOWING THE END OF YOUR SURGERY.  EVEN THOUGH YOU MAY FEEL NORMAL, YOUR REACTIONS MAY BE AFFECTED BY THE MEDICATION YOU HAVE RECEIVED.    DO NOT DRINK ALCOHOLIC BEVERAGES FOR 24 HOURS FOLLOWING YOUR SURGERY.    DRINK CLEAR LIQUIDS (APPLE JUICE, GINGER ALE, 7-UP, BROTH, ETC.).  PROGRESS TO YOUR REGULAR DIET AS YOU FEEL ABLE.    YOU MAY HAVE A DRY MOUTH, A SORE THROAT, MUSCLES ACHES OR TROUBLE SLEEPING.  THESE SHOULD GO AWAY AFTER 24 HOURS.    CALL YOUR DOCTOR FOR ANY OF THE FOLLOWING:  SIGNS OF INFECTION (FEVER, GROWING TENDERNESS AT THE SURGERY SITE, A LARGE AMOUNT OF DRAINAGE OR BLEEDING, SEVERE PAIN, FOUL-SMELLING DRAINAGE, REDNESS OR SWELLING.    IT HAS BEEN OVER 8 TO 10 HOURS SINCE SURGERY AND YOU ARE STILL NOT ABLE TO URINATE (PASS WATER).       HOME CARE FOLLOWING INGUINAL/FEMORAL HERNIA REPAIR  JUANI Kumar, LEIGHANN Brantley, MAURICE Blair & PRETTY  Isaiah    DIET:  Start with liquids and gradually resume your regular diet as tolerated.  Drink plenty of fluids.  While taking pain medications, consider use of a stool softener, increase your fiber in your diet, or add a fiber supplement (like Metamucil, Citrucel) to help prevent constipation - a possible side effect of pain medications.    NAUSEA:  If nauseated from the anesthetic/pain meds; rest in bed, get up cautiously with assistance, and drink clear liquids (juice, tea, broth).    ACTIVITY:  Light Activity -- you may immediately be up and about as tolerated.  Walking is encouraged, increase as tolerated.  Driving/Light Work-- when comfortable and off narcotic pain medications.  Strenuous Work/Activity -- limit lifting to 20 pounds for 3 weeks.  Active Sports (running, biking, etc.) -- cautiously resume after 4 weeks.    INCISIONAL CARE:  If you have a dressing in place, keep clean and dry for 48 hours; you may replace the gauze if it becomes soiled.  After 48 hours you may remove the dressing and shower.  Do not submerse incision in water for 1 week.  If you have a Dermabond dressing (a type of skin glue), you may shower immediately.  Sutures will absorb and need not be removed.  If present, leave the steri-strips (white paper tapes) in place for 14 days after surgery.  If present, leave Dermabond glue in place until it wears/flakes off.  Do not apply lotions, creams, or ointments to incisions.  Expect a variable amount of swelling/black and blue discoloration that may involve the penis/scrotum or labia.  Some numbness around the incision is common.  A lump/ridge under the incision is normal and will gradually resolve.    DISCOMFORT:  Local anesthetic placed at surgery should provide relief for 4-8 hours.  Begin taking pain pills before discomfort is severe.  Take the pain medication with some food, when possible, to minimize side effects.  Intermittent use of ice packs to the hernia repair site may help  during the first 1-3 weeks after surgery.  Expect gradual improvement.    Over-the-counter anti-inflammatory medications (i.e. Ibuprofen/Advil/Motrin or Naprosyn/Aleve) may be used per package instructions in addition to or while tapering off the narcotic pain medications to decrease swelling and sensitivity at the repair site.  DO NOT TAKE these Anti-inflammatory medications if your primary physician has advised against doing so, or if you have acid reflux, ulcer, or bleeding disorder, or take blood-thinner medications.  Call your primary physician or the surgery office if you have medication questions.    FOLLOW-UP AFTER SURGERY:  -Our office will contact you approximately 2-3 weeks after surgery to check on your progress and answer any questions you may have.  If you are doing well, you will not need to return for an office appointment.  If any concerns are identified over the phone, we will help you make an appointment to see a provider.    -If you have not received a phone call, have any questions or concerns, or would like to be seen, please call us at [phone].  We are located at: [address], [address]; [place] [address]    -CONTACT US IF THE FOLLOWING DEVELOPS:   1. A fever that is above 101     2. Increased redness, warmth, drainage, bleeding, or swelling.   3. Pain that is not relieved by rest/ice and your prescription.   4.  Increasing pain after 48 hours.   5. Drainage that is thick, cloudy, yellow, green or white.   6. Any other questions or concerns.     Maximum acetaminophen (Tylenol) dose from all sources should not exceed 4 grams (4000 mg) per day.    You received Toradol, an IV form of Ibuprofen (Motrin) at 9 AM.  Do not take any Ibuprofen products until 3 PM.

## 2022-08-18 NOTE — OP NOTE
[facility] General Surgery Operative Note    Pre-operative diagnosis: Left Inguinal hernia   Post-operative diagnosis: same   Procedure: Robotic assisted laparoscopic left inguinal hernia repair   Surgeon: Corrie Colon MD   Assistant(s): Colleen Mackey PA-C  The Physician Assistant was medically necessary for their expertise in prepping, camera management, exchanging robotic instruments, passing suture and mesh through the robotic ports, and suturing   Anesthesia: general   Estimated blood loss:  Specimen:  FINDINGS:  2 cc  none  Very small left direct hernia       Indication for Procedure: This is a 32 year old male who presented to the office with a symptomatic left inguinal hernia. After discussing risks and benefits, they agreed to proceed with robotic approach.  Description of procedure:  Patient was brought to the operating room, placed on the operating table in supine position. Anesthesia was induced. His pressure points were padded and he was secured with a safety strap. A timeout was called to verify the patient, site of procedure and procedure to be performed.  A 2cm incision 20cm above the pubis was made and carried down to the fascia. A small nick was made in the fascia. An 0 vicryl figure of eight suture was placed in the fascia. The abdomen was entered bluntly with a carmalt clamp. An 8mm port was placed and the abdomen insufflated with CO2.  Two 8mm ports were then placed on right and left lateral abdomen, 8cm from the midline port. No obvious hernias were noted on either side through the peritoneum. A left ilioinguinal nerve block was completed with 0.25%marcaine with epinephrine as I planned to explore that side for occult hernias.   The peritoneum was opened transversely from the left medial umbilical ligament to the lateral abdominal wall 5cm above the internal ring and ASIS. The peritoneum was dissected away from the abdominal wall focusing first on the medial dissection to identify  the pubic tubercle and coopers ligament, staying directly on the rectus muscle then the lateral dissection toward the iliac crest. Laterally, care was taken to stay directly on the peritoneum.   There was a small amount of preperitoneal fat herniating through a small defect medial to the epigastrics and medially I could see the ridge of the conjoined tendon. This fat was reduced. The cord was checked for a cord lipoma, and a very small one reduced and ligated then removed from the abdomen. At the conclusion of the dissection the peritoneum could be pulled on without movement of the cord structures. A 10cm x 15cm piece of progrip mesh was placed in the pocket and unfurled to lay flat and completely cover the myopectineal orifice, crossing the pubic bone medially, 2cm below the joon's ligament and ensuring it would be above the inferior peritoneal edge. The peritoneum was closed in a running fashion with a 3-0 V-lock suture. A small hole in the peritoneum was closed with 3-0 vicryl.   The cavity was inspected and there was adequate hemostasis.  Pneumoperitoneum was partially let down then brought back up to evacuate some of the gas from the preperitoneal space and the inferior edges of both meshes were seen to be laying flat with no curling.   The trocars were then removed. The previously placed 0 vicryl suture in the supraumbilical fascia was tied down to good effect. 0.25% marcaine was injected along the fascia and subcutaneous tissues. The skin was closed with 4-0 suture. Sterile dressings were applied. At the end of the operation, all sponge, instrument, and needle counts were correct.     Corrie Colon MD

## 2022-08-18 NOTE — ANESTHESIA PROCEDURE NOTES
Airway       Patient location during procedure: OR       Procedure Start/Stop Times: 8/18/2022 7:47 AM  Staff -        CRNA: Luis Xiao APRN CRNA       Performed By: CRNA  Consent for Airway        Urgency: elective  Indications and Patient Condition       Indications for airway management: lisbeth-procedural       Induction type:intravenous       Mask difficulty assessment: 1 - vent by mask    Final Airway Details       Final airway type: endotracheal airway       Successful airway: ETT - single and Oral  Endotracheal Airway Details        ETT size (mm): 8.0       Cuffed: yes       Successful intubation technique: direct laryngoscopy       DL Blade Type: Issa 2       Grade View of Cords: 1       Adjucts: stylet       Position: Right       Measured from: gums/teeth       Secured at (cm): 23       Bite block used: Soft    Post intubation assessment        Placement verified by: capnometry, equal breath sounds and chest rise        Number of attempts at approach: 1       Number of other approaches attempted: 0       Secured with: plastic tape       Ease of procedure: easy       Dentition: Intact and Unchanged    Medication(s) Administered   Medication Administration Time: 8/18/2022 7:47 AM

## 2022-08-18 NOTE — ANESTHESIA PREPROCEDURE EVALUATION
Anesthesia Pre-Procedure Evaluation    Patient: Ten Haro   MRN: [id number] : 1990        Procedure : Procedure(s):  Xi Robotic Assisted HERNIORRHAPHY, INGUINAL, LEFT POSSIBLE RIGHT          History reviewed. No pertinent past medical history.   Past Surgical History:   Procedure Laterality Date     APPENDECTOMY            No Known Allergies   Social History     Tobacco Use     Smoking status: Never Smoker     Smokeless tobacco: Never Used   Substance Use Topics     Alcohol use: Yes     Comment: 1 drink per month      Wt Readings from Last 1 Encounters:   22 76.2 kg (168 lb)        Anesthesia Evaluation   Pt has had prior anesthetic. Type: General.    No history of anesthetic complications       ROS/MED HX  ENT/Pulmonary:  - neg pulmonary ROS     Neurologic:  - neg neurologic ROS     Cardiovascular:  - neg cardiovascular ROS     METS/Exercise Tolerance:     Hematologic:  - neg hematologic  ROS     Musculoskeletal:  - neg musculoskeletal ROS     GI/Hepatic:  - neg GI/hepatic ROS     Renal/Genitourinary:  - neg Renal ROS     Endo:  - neg endo ROS     Psychiatric/Substance Use:  - neg psychiatric ROS     Infectious Disease:  - neg infectious disease ROS     Malignancy:       Other:            Physical Exam    Airway        Mallampati: II   TM distance: > 3 FB   Neck ROM: full   Mouth opening: > 3 cm    Respiratory Devices and Support         Dental  no notable dental history         Cardiovascular   cardiovascular exam normal          Pulmonary   pulmonary exam normal                OUTSIDE LABS:  CBC:   Lab Results   Component Value Date    WBC 7.1 2022    HGB 14.1 2022    HCT 42.7 2022     2022     BMP:   Lab Results   Component Value Date     2022    POTASSIUM 3.8 2022    CHLORIDE 108 2022    CO2 25 2022    BUN 20 2022    CR 0.88 2022     (H) 2022     COAGS:   Lab Results   Component Value Date     PTT 27 06/24/2022    INR 1.09 06/24/2022     POC: No results found for: BGM, HCG, HCGS  HEPATIC: No results found for: ALBUMIN, PROTTOTAL, ALT, AST, GGT, ALKPHOS, BILITOTAL, BILIDIRECT, STEPHAN  OTHER:   Lab Results   Component Value Date    LAN 9.1 06/24/2022       Anesthesia Plan    ASA Status:  1      Anesthesia Type: General.     - Airway: ETT   Induction: Intravenous.   Maintenance: Balanced.        Consents    Anesthesia Plan(s) and associated risks, benefits, and realistic alternatives discussed. Questions answered and patient/representative(s) expressed understanding.    - Discussed:     - Discussed with:  Patient      - Extended Intubation/Ventilatory Support Discussed: No.      - Patient is DNR/DNI Status: No    Use of blood products discussed: No .     Postoperative Care    Pain management: IV analgesics, Oral pain medications, Multi-modal analgesia.   PONV prophylaxis: Ondansetron (or other 5HT-3), Dexamethasone or Solumedrol     Comments:                Maycol Hernandez MD

## 2022-08-18 NOTE — ANESTHESIA POSTPROCEDURE EVALUATION
Patient: Ten Haro    Procedure: Procedure(s):  Xi Robotic Assisted HERNIORRHAPHY, INGUINAL, LEFT       Anesthesia Type:  General    Note:  Disposition: Outpatient   Postop Pain Control: Uneventful            Sign Out: Well controlled pain   PONV: No   Neuro/Psych: Uneventful            Sign Out: Acceptable/Baseline neuro status   Airway/Respiratory: Uneventful            Sign Out: Acceptable/Baseline resp. status   CV/Hemodynamics: Uneventful            Sign Out: Acceptable CV status; No obvious hypovolemia; No obvious fluid overload   Other NRE: NONE   DID A NON-ROUTINE EVENT OCCUR? No           Last vitals:  Vitals Value Taken Time   BP 99/72 08/18/22 1142   Temp 96.9  F (36.1  C) 08/18/22 1055   Pulse 61 08/18/22 1142   Resp 16 08/18/22 1142   SpO2 93 % 08/18/22 1142       Electronically Signed By: Maycol Hernandez MD  August 18, 2022  2:46 PM

## 2022-08-18 NOTE — ANESTHESIA CARE TRANSFER NOTE
Patient: Ten Haro    Procedure: Procedure(s):  Xi Robotic Assisted HERNIORRHAPHY, INGUINAL, LEFT       Diagnosis: Non-recurrent inguinal hernia of left side without obstruction or gangrene [K40.90]  Diagnosis Additional Information: No value filed.    Anesthesia Type:   General     Note:    Oropharynx: oropharynx clear of all foreign objects and spontaneously breathing  Level of Consciousness: drowsy  Oxygen Supplementation: face mask  Level of Supplemental Oxygen (L/min / FiO2): 8  Independent Airway: airway patency satisfactory and stable  Dentition: dentition unchanged  Vital Signs Stable: post-procedure vital signs reviewed and stable  Report to RN Given: handoff report given  Patient transferred to: PACU    Handoff Report: Identifed the Patient, Identified the Reponsible Provider, Reviewed the pertinent medical history, Discussed the surgical course, Reviewed Intra-OP anesthesia mangement and issues during anesthesia, Set expectations for post-procedure period and Allowed opportunity for questions and acknowledgement of understanding      Vitals:  Vitals Value Taken Time   /75 08/18/22 0911   Temp     Pulse 96 08/18/22 0913   Resp 25 08/18/22 0913   SpO2 100 % 08/18/22 0912   Vitals shown include unvalidated device data.    Electronically Signed By: DEBBIE Saldivar CRNA  August 18, 2022  9:15 AM

## 2022-09-03 ENCOUNTER — HEALTH MAINTENANCE LETTER (OUTPATIENT)
Age: 32
End: 2022-09-03

## 2022-09-06 ENCOUNTER — TELEPHONE (OUTPATIENT)
Dept: SURGERY | Facility: CLINIC | Age: 32
End: 2022-09-06

## 2022-09-06 NOTE — TELEPHONE ENCOUNTER
S/p Robotic assisted laparoscopic left inguinal hernia repair  Procedure date: 8/18/22  Surgeon: Dr. Colon    Patient reports that he went back to work today and has noted increased pain.  He works as a  - no heavy lifting but does squatting and bending.    He has not tried to take anything for this pain.      States pain is a constant dull pain that starts in left groin and goes down to left testicle. Pain is tolerable.  Notices increased pain with squatting.     He denies any swelling, lump or bulge.  No pain at incision sites.    No redness, swelling or drainage from incisions.  No fever/chills.    We discussed he can try using ibuprofen 600mg ( taken with food) every 6 hours prn.  As well as the use of a cold pack to site bid.    If patient gets no relief from ibuprofen, or increased pain or if new symptoms of swelling, redness, fever or drainage from incisions he will call clinic and schedule a post-op appointment.      Patient verbalizes understanding and agrees with plan.

## 2022-09-06 NOTE — TELEPHONE ENCOUNTER
Name of caller:Ten    Reason for Call:  Pt sates he went back to work today and is now experiencing pain. He also said that he never got a PO call.    Surgeon:  Dr. Colon    Recent Surgery:  Yes.    If yes, when & what type:  8/18, Robotic assisted laparoscopic left inguinal hernia repair      Best phone number to reach pt at is: [phone]  Ok to leave a message with medical info? Yes.    Pharmacy preferred (if calling for a refill): NA

## 2022-09-08 ENCOUNTER — TELEPHONE (OUTPATIENT)
Dept: SURGERY | Facility: CLINIC | Age: 32
End: 2022-09-08

## 2022-09-08 NOTE — TELEPHONE ENCOUNTER
[facility]   Postoperative Follow-up Phone Call  -Call to patient to review recent procedure and recovery    Procedure Date:  August/18 2022  Surgeon:  Dr. Colon  Procedure:  Robotic Left Inguinal hernia repair with mesh    [employer] Tracking Questions:  Date/Followed up on: POD# 21  Complications: None  Urinary retention: Yes . Normal on day 1.  Patient satisfaction from 1-5 (5 best): 5  Days on rx opioid: 0  Days on non-opioid meds: currently taking ibuprofen, started on POD #19 since RTW.  Days to little/no pain: still with discomfort/pain  Days to normal activity: 10  Refill of pain meds needed? No    He is feeling well, tolerating normal diet, normal bowel function and denies incision concerns.   Current pain management: ibuprofen  Following restrictions per surgeon; 20lb weight limit for 3 weeks postop.      Pt concerns:  None    Pt is recommended to contact the office if worsening pain, onset of fever/redness at any inc site, or new drainage from the area.  Pt also recommended to call office at any time if ongoing questions/concerns during recovery, but otherwise may follow-up on a prn basis.  Ten is in agreement with this plan.    Colleen Mackey PA-C

## 2022-09-28 ASSESSMENT — ENCOUNTER SYMPTOMS
ARTHRALGIAS: 0
DYSURIA: 0
FREQUENCY: 0
ABDOMINAL PAIN: 0
HEMATOCHEZIA: 0
HEADACHES: 0
EYE PAIN: 1
CHILLS: 0
HEMATURIA: 0
COUGH: 0
NAUSEA: 0
FEVER: 0
SHORTNESS OF BREATH: 0
HEARTBURN: 0
SORE THROAT: 0
JOINT SWELLING: 0
WEAKNESS: 0
MYALGIAS: 0
PARESTHESIAS: 0
DIARRHEA: 0
PALPITATIONS: 0
NERVOUS/ANXIOUS: 1
CONSTIPATION: 0
DIZZINESS: 0

## 2022-09-29 ENCOUNTER — OFFICE VISIT (OUTPATIENT)
Dept: FAMILY MEDICINE | Facility: CLINIC | Age: 32
End: 2022-09-29
Payer: COMMERCIAL

## 2022-09-29 VITALS
WEIGHT: 170.2 LBS | TEMPERATURE: 98.9 F | HEIGHT: 70 IN | SYSTOLIC BLOOD PRESSURE: 127 MMHG | BODY MASS INDEX: 24.37 KG/M2 | OXYGEN SATURATION: 99 % | HEART RATE: 71 BPM | DIASTOLIC BLOOD PRESSURE: 84 MMHG

## 2022-09-29 DIAGNOSIS — Z00.00 ROUTINE HISTORY AND PHYSICAL EXAMINATION OF ADULT: Primary | ICD-10-CM

## 2022-09-29 DIAGNOSIS — J30.2 SEASONAL ALLERGIC RHINITIS, UNSPECIFIED TRIGGER: ICD-10-CM

## 2022-09-29 PROCEDURE — 99395 PREV VISIT EST AGE 18-39: CPT | Performed by: INTERNAL MEDICINE

## 2022-09-29 ASSESSMENT — PAIN SCALES - GENERAL: PAINLEVEL: NO PAIN (0)

## 2022-09-29 NOTE — PROGRESS NOTES
SUBJECTIVE:   CC: Ten is an 32 year old who presents for preventative health visit.       Patient has been advised of split billing requirements and indicates understanding: Yes  Healthy Habits:     Getting at least 3 servings of Calcium per day:  NO    Bi-annual eye exam:  NO    Dental care twice a year:  Yes    Sleep apnea or symptoms of sleep apnea:  None    Diet:  Regular (no restrictions)    Frequency of exercise:  None    Taking medications regularly:  Yes    Barriers to taking medications:  None    Medication side effects:  Not applicable    PHQ-2 Total Score: 0    Additional concerns today:  No        Today's PHQ-2 Score:   PHQ-2 ( 1999 [employer]) 9/28/2022   Q1: Little interest or pleasure in doing things 0   Q2: Feeling down, depressed or hopeless 0   PHQ-2 Score 0   PHQ-2 Total Score (12-17 Years)- Positive if 3 or more points; Administer PHQ-A if positive -   Q1: Little interest or pleasure in doing things Not at all   Q2: Feeling down, depressed or hopeless Not at all   PHQ-2 Score 0       Abuse: Current or Past(Physical, Sexual or Emotional)- No  Do you feel safe in your environment? Yes    Have you ever done Advance Care Planning? (For example, a Health Directive, POLST, or a discussion with a medical provider or your loved ones about your wishes): No, advance care planning information given to patient to review.  Patient declined advance care planning discussion at this time.    Social History     Tobacco Use     Smoking status: Never Smoker     Smokeless tobacco: Never Used   Substance Use Topics     Alcohol use: Yes     Comment: 1 drink per month     If you drink alcohol do you typically have >3 drinks per day or >7 drinks per week? No    Alcohol Use 9/28/2022   Prescreen: >3 drinks/day or >7 drinks/week? No   Prescreen: >3 drinks/day or >7 drinks/week? -     Last PSA: No results found for: PSA    Reviewed orders with patient. Reviewed health maintenance and updated orders accordingly - Yes  Labs  "reviewed in EPIC    Reviewed and updated as needed this visit by clinical staff                    Reviewed and updated as needed this visit by Provider                   Past Medical History:   Diagnosis Date     Non-recurrent unilateral inguinal hernia without obstruction or gangrene         Review of Systems  CONSTITUTIONAL: NEGATIVE for fever, chills, change in weight  INTEGUMENTARY/SKIN: NEGATIVE for worrisome rashes, moles or lesions  EYES: NEGATIVE for vision changes or irritation  ENT: NEGATIVE for ear, mouth and throat problems  RESP: NEGATIVE for significant cough or SOB  CV: NEGATIVE for chest pain, palpitations or peripheral edema  GI: NEGATIVE for nausea, abdominal pain, heartburn, or change in bowel habits   male: negative for dysuria, hematuria, decreased urinary stream, erectile dysfunction, urethral discharge  MUSCULOSKELETAL: NEGATIVE for significant arthralgias or myalgia  NEURO: NEGATIVE for weakness, dizziness or paresthesias  PSYCHIATRIC: NEGATIVE for changes in mood or affect    OBJECTIVE:   /84   Pulse 71   Temp 98.9  F (37.2  C) (Oral)   Ht 1.776 m (5' 9.92\")   Wt 77.2 kg (170 lb 3.2 oz)   SpO2 99%   BMI 24.48 kg/m      Physical Exam  GENERAL: alert and no distress  EYES: Eyes grossly normal to inspection, PERRL and conjunctivae and sclerae normal  HENT: ear canals and TM's normal, nose and mouth without ulcers or lesions  NECK: no adenopathy, no asymmetry, masses, or scars and thyroid normal to palpation  RESP: lungs clear to auscultation  CV: regular rate and rhythm  ABDOMEN: soft, nontender, no hepatosplenomegaly, no masses and bowel sounds normal  MS: no gross musculoskeletal defects noted, no edema  SKIN: no suspicious lesions or rashes  NEURO: Normal strength and tone, mentation intact and speech normal  PSYCH: mentation appears normal, affect normal/bright    Diagnostic Test Results:  Labs reviewed in Epic    ASSESSMENT/PLAN:   Ten was seen today for " "physical.    Diagnoses and all orders for this visit:    Routine history and physical examination of adult    Seasonal allergic rhinitis, unspecified trigger  -     Adult Allergy/Asthma Referral; Future    - diet, exercise      Patient has been advised of split billing requirements and indicates understanding: Yes    COUNSELING:   Reviewed preventive health counseling, as reflected in patient instructions  Special attention given to:        Regular exercise       Healthy diet/nutrition    Estimated body mass index is 24.11 kg/m  as calculated from the following:    Height as of 8/18/22: 1.778 m (5' 10\").    Weight as of 8/18/22: 76.2 kg (168 lb).         He reports that he has never smoked. He has never used smokeless tobacco.      Counseling Resources:  ATP IV Guidelines  Pooled Cohorts Equation Calculator  FRAX Risk Assessment  ICSI Preventive Guidelines  Dietary Guidelines for Americans, 2010  USDA's MyPlate  ASA Prophylaxis  Lung CA Screening    Nuria Mark MD  [facility]  "

## 2022-10-04 ENCOUNTER — OFFICE VISIT (OUTPATIENT)
Dept: FAMILY MEDICINE | Facility: CLINIC | Age: 32
End: 2022-10-04
Payer: COMMERCIAL

## 2022-10-04 DIAGNOSIS — Z80.8 FAMILY HISTORY OF MELANOMA: ICD-10-CM

## 2022-10-04 DIAGNOSIS — Z12.83 SKIN CANCER SCREENING: ICD-10-CM

## 2022-10-04 DIAGNOSIS — D22.9 MULTIPLE BENIGN NEVI: ICD-10-CM

## 2022-10-04 DIAGNOSIS — D18.01 CHERRY ANGIOMA: Primary | ICD-10-CM

## 2022-10-04 PROCEDURE — 99203 OFFICE O/P NEW LOW 30 MIN: CPT | Performed by: PHYSICIAN ASSISTANT

## 2022-10-04 ASSESSMENT — PAIN SCALES - GENERAL: PAINLEVEL: NO PAIN (0)

## 2022-10-04 NOTE — PROGRESS NOTES
[facility] Dermatology Note  Encounter Date: Oct 4, 2022  Office Visit     Dermatology Problem List:  1. Family history of melanoma (father)  ____________________________________________    Assessment & Plan:    # Family history of melanoma (father)   - ABCDEs: Counseled ABCDEs of melanoma: Asymmetry, Border (irregularity), Color (not uniform, changes in color), Diameter (greater than 6 mm which is about the size of a pencil eraser), and Evolving (any changes in preexisting moles).    # Benign lesions: Multiple benign nevi and cherry angiomas. Explained to patient benign nature of lesion. No treatment is necessary at this time unless the lesion changes or becomes symptomatic.   - ABCDs of melanoma were discussed and self skin checks were advised.  - Sun precaution was advised including the use of sun screens of SPF 30 or higher, sun protective clothing, and avoidance of tanning beds.  -Samples of sunscreen given.    Procedures Performed:   None.    Follow-up: 1 year(s) in-person, or earlier for new or changing lesions    Staff and Scribe:     Scribe Disclosure:  I, Julio Jones, am serving as a scribe to document services personally performed by Deysi Reyes PA-C based on data collection and the provider's statements to me.     Provider Disclosure:   The documentation recorded by the scribe accurately reflects the services I personally performed and the decisions made by me.    All risks, benefits and alternatives were discussed with patient.  Patient is in agreement and understands the assessment and plan.  All questions were answered.  Sun Screen Education was given.   Return to Clinic annually or sooner as needed.   Deysi Reyes PA-C   [facility]   ____________________________________________    CC: Skin Check ([facility], lost dad to melanoma approx 6 months and 2 paternal uncles recently diagnosed with skin cancer )    HPI:  Mr. Ten Haro  is a(n) 32 year old male who presents today as a new patient for FBSE.    Today, patient reports one spot of concern on his nose. Patient does not wear sunscreen and spends time outside but works inside. Patient reports that he does burn. Patient is otherwise feeling well, without additional skin concerns.    Labs Reviewed:  N/A    Physical Exam:  Vitals: There were no vitals taken for this visit.  SKIN: Total skin excluding the undergarment areas was performed. The exam included the head/face, neck, both arms, chest, back, abdomen, both legs, digits and/or nails.   - Patch on left upper buttock, multiple light brown macules.  - Multiple regular brown pigmented macules and papules are identified on the trunk and extremities. .   - There are dome shaped bright red papules on the trunk and extremities.  - No other lesions of concern on areas examined.     Medications:  Current Outpatient Medications   Medication     loratadine-pseudoePHEDrine (CLARITIN-D 12-HOUR) 5-120 MG 12 hr tablet     No current facility-administered medications for this visit.      Past Medical History:   Patient Active Problem List   Diagnosis     History of sciatica     Past Medical History:   Diagnosis Date     Non-recurrent unilateral inguinal hernia without obstruction or gangrene         CC No referring provider defined for this encounter. on close of this encounter.

## 2022-10-04 NOTE — LETTER
10/4/2022         RE: Ten Haro  [address]  [place] [address]        Dear Colleague,    Thank you for referring your patient, Ten Haro, to the [facility]. Please see a copy of my visit note below.    [facility] Dermatology Note  Encounter Date: Oct 4, 2022  Office Visit     Dermatology Problem List:  1. Family history of melanoma (father)  ____________________________________________    Assessment & Plan:    # Family history of melanoma (father)   - ABCDEs: Counseled ABCDEs of melanoma: Asymmetry, Border (irregularity), Color (not uniform, changes in color), Diameter (greater than 6 mm which is about the size of a pencil eraser), and Evolving (any changes in preexisting moles).    # Benign lesions: Multiple benign nevi and cherry angiomas. Explained to patient benign nature of lesion. No treatment is necessary at this time unless the lesion changes or becomes symptomatic.   - ABCDs of melanoma were discussed and self skin checks were advised.  - Sun precaution was advised including the use of sun screens of SPF 30 or higher, sun protective clothing, and avoidance of tanning beds.  -Samples of sunscreen given.    Procedures Performed:   None.    Follow-up: 1 year(s) in-person, or earlier for new or changing lesions    Staff and Scribe:     Scribe Disclosure:  I, Julio Jones, am serving as a scribe to document services personally performed by Deysi Reyes PA-C based on data collection and the provider's statements to me.     Provider Disclosure:   The documentation recorded by the scribe accurately reflects the services I personally performed and the decisions made by me.    All risks, benefits and alternatives were discussed with patient.  Patient is in agreement and understands the assessment and plan.  All questions were answered.  Sun Screen Education was given.   Return to Clinic annually or sooner as needed.    Deysi Reyes PA-C   [facility]   ____________________________________________    CC: Skin Check ([facility], lost dad to melanoma approx 6 months and 2 paternal uncles recently diagnosed with skin cancer )    HPI:  Mr. Ten Haro is a(n) 32 year old male who presents today as a new patient for FBSE.    Today, patient reports one spot of concern on his nose. Patient does not wear sunscreen and spends time outside but works inside. Patient reports that he does burn. Patient is otherwise feeling well, without additional skin concerns.    Labs Reviewed:  N/A    Physical Exam:  Vitals: There were no vitals taken for this visit.  SKIN: Total skin excluding the undergarment areas was performed. The exam included the head/face, neck, both arms, chest, back, abdomen, both legs, digits and/or nails.   - Patch on left upper buttock, multiple light brown macules.  - Multiple regular brown pigmented macules and papules are identified on the trunk and extremities. .   - There are dome shaped bright red papules on the trunk and extremities.  - No other lesions of concern on areas examined.     Medications:  Current Outpatient Medications   Medication     loratadine-pseudoePHEDrine (CLARITIN-D 12-HOUR) 5-120 MG 12 hr tablet     No current facility-administered medications for this visit.      Past Medical History:   Patient Active Problem List   Diagnosis     History of sciatica     Past Medical History:   Diagnosis Date     Non-recurrent unilateral inguinal hernia without obstruction or gangrene         CC No referring provider defined for this encounter. on close of this encounter.      Again, thank you for allowing me to participate in the care of your patient.        Sincerely,        Deysi Reyes PA-C

## 2022-10-04 NOTE — PATIENT INSTRUCTIONS
Patient Education     Checking for Skin Cancer  You can find cancer early by checking your skin each month. There are 3 kinds of skin cancer. They are melanoma, basal cell carcinoma, and squamous cell carcinoma. Doing monthly skin checks is the best way to find new marks or skin changes. Follow the instructions below for checking your skin.   The ABCDEs of checking moles for melanoma   Check your moles or growths for signs of melanoma using ABCDE:   Asymmetry: the sides of the mole or growth don t match  Border: the edges are ragged, notched, or blurred  Color: the color within the mole or growth varies  Diameter: the mole or growth is larger than 6 mm (size of a pencil eraser)  Evolving: the size, shape, or color of the mole or growth is changing (evolving is not shown in the images below)    Checking for other types of skin cancer  Basal cell carcinoma or squamous cell carcinoma have symptoms such as:     A spot or mole that looks different from all other marks on your skin  Changes in how an area feels, such as itching, tenderness, or pain  Changes in the skin's surface, such as oozing, bleeding, or scaliness  A sore that does not heal  New swelling or redness beyond the border of a mole    Who s at risk?  Anyone can get skin cancer. But you are at greater risk if you have:   Fair skin, light-colored hair, or light-colored eyes  Many moles or abnormal moles on your skin  A history of sunburns from sunlight or tanning beds  A family history of skin cancer  A history of exposure to radiation or chemicals  A weakened immune system  If you have had skin cancer in the past, you are at risk for recurring skin cancer.   How to check your skin  Do your monthly skin checkups in front of a full-length mirror. Check all parts of your body, including your:   Head (ears, face, neck, and scalp)  Torso (front, back, and sides)  Arms (tops, undersides, upper, and lower armpits)  Hands (palms, backs, and fingers, including  under the nails)  Buttocks and genitals  Legs (front, back, and sides)  Feet (tops, soles, toes, including under the nails, and between toes)  If you have a lot of moles, take digital photos of them each month. Make sure to take photos both up close and from a distance. These can help you see if any moles change over time.   Most skin changes are not cancer. But if you see any changes in your skin, call your doctor right away. Only he or she can diagnose a problem. If you have skin cancer, seeing your doctor can be the first step toward getting the treatment that could save your life.   [employer] last reviewed this educational content on 4/1/2019 2000-2020 The "[employer]. [address] [place] [address], [place] [address]. All rights reserved. This information is not intended as a substitute for professional medical care. Always follow your healthcare professional's instructions.       When should I call my doctor?  If you are worsening or not improving, please, contact us or seek urgent care as noted below.     Who should I call with questions (adults)?  [facility] (adult and pediatric): [phone]  [facility] (adult): [phone]  For urgent needs outside of business hours call the [facility] at [phone] and ask for the dermatology resident on call to be paged  If this is a medical emergency and you are unable to reach an ER, Call [phone]    Who should I call with questions (pediatric)?  [facility]- Pediatric Dermatology  Dr. Khushi Ghotra, Dr. Phuc Fountain, Dr. Bina Oakley, KARIE Dewitt, Dr. Elizabeth Zimmerman, Dr. Ivonne Schmitt & Dr. Devante Cabello  Non-urgent nurse triage line; [phone]- Eliane and Aleta SHEN Care Coordinatordaryl Haynes (/Complex ) [phone]    If you need a prescription refill, please contact your pharmacy. Refills are approved or denied by our  Physicians during normal business hours, Monday through Fridays  Per office policy, refills will not be granted if you have not been seen within the past year (or sooner depending on your child's condition)    Scheduling Information:  Pediatric Appointment Scheduling and Call Center [phone]  Radiology Scheduling- [phone]  Sedation Unit Scheduling- [phone]  [facility] Scheduling- General [phone]; Pediatric Dermatology [phone]  Main  Services: [phone]  Indonesian: [phone]  Irish: [phone]  Hmong/Australian/Malay: [phone]  Preadmission Nursing Department Fax Number: [phone] (Fax all pre-operative paperwork to this number)    For urgent matters arising during evenings, weekends, or holidays that cannot wait for normal business hours please call [phone] and ask for the dermatology resident on call to be paged.

## 2023-01-03 ENCOUNTER — OFFICE VISIT (OUTPATIENT)
Dept: ALLERGY | Facility: CLINIC | Age: 33
End: 2023-01-03
Attending: INTERNAL MEDICINE
Payer: COMMERCIAL

## 2023-01-03 ENCOUNTER — LAB (OUTPATIENT)
Dept: LAB | Facility: CLINIC | Age: 33
End: 2023-01-03
Payer: COMMERCIAL

## 2023-01-03 VITALS
OXYGEN SATURATION: 100 % | SYSTOLIC BLOOD PRESSURE: 122 MMHG | WEIGHT: 169.8 LBS | HEART RATE: 79 BPM | BODY MASS INDEX: 24.42 KG/M2 | DIASTOLIC BLOOD PRESSURE: 82 MMHG

## 2023-01-03 DIAGNOSIS — H57.9 PRURITUS OF BOTH EYES: ICD-10-CM

## 2023-01-03 DIAGNOSIS — H04.203 WATERY EYES: ICD-10-CM

## 2023-01-03 DIAGNOSIS — H04.203 WATERY EYES: Primary | ICD-10-CM

## 2023-01-03 DIAGNOSIS — J30.2 SEASONAL ALLERGIC RHINITIS, UNSPECIFIED TRIGGER: ICD-10-CM

## 2023-01-03 PROCEDURE — 36415 COLL VENOUS BLD VENIPUNCTURE: CPT

## 2023-01-03 PROCEDURE — 86003 ALLG SPEC IGE CRUDE XTRC EA: CPT | Mod: 59

## 2023-01-03 PROCEDURE — 95004 PERQ TESTS W/ALRGNC XTRCS: CPT | Performed by: INTERNAL MEDICINE

## 2023-01-03 PROCEDURE — 86003 ALLG SPEC IGE CRUDE XTRC EA: CPT

## 2023-01-03 PROCEDURE — 99204 OFFICE O/P NEW MOD 45 MIN: CPT | Mod: 25 | Performed by: INTERNAL MEDICINE

## 2023-01-03 ASSESSMENT — ENCOUNTER SYMPTOMS
SHORTNESS OF BREATH: 0
COLOR CHANGE: 0
EYE PAIN: 1
SORE THROAT: 0
ARTHRALGIAS: 0
ABDOMINAL PAIN: 0
COUGH: 0
PALPITATIONS: 0
SEIZURES: 0
HEMATURIA: 0
EYE REDNESS: 1
EYE ITCHING: 1
CHILLS: 0
BACK PAIN: 0
FEVER: 0
DYSURIA: 0
VOMITING: 0

## 2023-01-03 NOTE — PATIENT INSTRUCTIONS
Try Refresh eyedrops or other lubricating eyedrops twice daily  Will check labs for allergies also  Referred to Opthalmology for further evaluation.      Allergy Staff Appt Hours Shot Hours Location         Physician   Julio Toure MD      Support Staff   AC Alonzo, RN   Kaleb LEIGH, ROHAN RICHARDS, LPN        Mondays Tuesdays Thursdays and Fridays:  [place] 7-5 Wednesdays  Close                [place]     Tuesdays: 7:40-3:20      Fridays: 7:40-3:20              [facility]  [address]  [place] [address]  Appt Line: [phone]    Pulmonary Function Scheduling:  [place]: [phone]         Questions about cost of your care?  For questions about your cost of your visit, procedure, lab or imaging contact:  [employer] [place] Consumer Price Line [phone] or visit: [email or web address]

## 2023-01-03 NOTE — PROGRESS NOTES
Per provider verbal order, placed Adult Environmental Panel scratch test. Once panels were placed, patient was monitored for 15 minutes in clinic.  Provider read test after 15 minutes.  Pt tolerated procedure well.  All questions and concerns were addressed at office visit.       JERRY ValdezN, RN

## 2023-01-03 NOTE — PROGRESS NOTES
Ten Haro was seen in the Allergy Clinic at [facility].    Ten Haro is a 32 year old male being seen today at the request of Nuria Mark MD in consultation for allergy symptoms.    Symptoms include eye itching and watering of his eyes year-round.  Initially thought his symptoms started around 3 to 4 years ago.  Upon further questioning he does believe that it happened after his LASIK surgery 7 years ago.  He feels that that his eyes have been more sensitive to wind which causes them to water more frequently.  He has rare rhinorrhea.    He has tried antihistamines which seems to cause more burning of his eyes.  Occasionally his eyelids will feel raw in nature but he does not have any vision changes.  He did feel like his eye symptoms improved while in [place].  He is a  so he does have a haile environment at work.  Symptoms occur at work and at home.  He does have 2 cats at home.  He is wondering if there is an allergic component.    He has not seen an ophthalmologist since his surgery.      Past Medical History:   Diagnosis Date     Non-recurrent unilateral inguinal hernia without obstruction or gangrene      Family History   Problem Relation Age of Onset     Hypertension Mother      Hypertension Father      Other Cancer Father      Melanoma Father      Asthma Sister      Skin Cancer Paternal Uncle      Skin Cancer Paternal Uncle      Past Surgical History:   Procedure Laterality Date     APPENDECTOMY      2002     DAVINCI XI HERNIORRHAPHY INGUINAL Left 08/18/2022    Procedure: Xi Robotic Assisted HERNIORRHAPHY, INGUINAL, LEFT;  Surgeon: Corrie Colon MD;  Location: [facility] OR     HERNIA REPAIR  8/22       ENVIRONMENTAL HISTORY:   Pets inside the house include 2 cat(s).  Do you smoke cigarettes or other recreational drugs? No There is/are 0 smokers living in the house. The house does not have a damp basement.     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      Review of Systems   Constitutional: Negative for chills and fever.   HENT: Negative for ear pain and sore throat.    Eyes: Positive for pain, redness and itching. Negative for visual disturbance.   Respiratory: Negative for cough and shortness of breath.    Cardiovascular: Negative for chest pain and palpitations.   Gastrointestinal: Negative for abdominal pain and vomiting.   Genitourinary: Negative for dysuria and hematuria.   Musculoskeletal: Negative for arthralgias and back pain.   Skin: Negative for color change and rash.   Neurological: Negative for seizures and syncope.   All other systems reviewed and are negative.      No current outpatient medications on file.  No Known Allergies      EXAM:   /82   Pulse 79   Wt 77 kg (169 lb 12.8 oz)   SpO2 100%   BMI 24.42 kg/m      Physical Exam    Constitutional:       General: He is not in acute distress.     Appearance: Normal appearance. He is not ill-appearing.   HENT:      Head: Normocephalic and atraumatic.      Nose: Nose normal. No congestion or rhinorrhea.      Mouth/Throat:      Mouth: Mucous membranes are moist.      Pharynx: Oropharynx is clear. No posterior oropharyngeal erythema.   Eyes:      General:         Right eye: No discharge.         Left eye: No discharge.   Cardiovascular:      Rate and Rhythm: Normal rate and regular rhythm.      Heart sounds: Normal heart sounds.   Pulmonary:      Effort: Pulmonary effort is normal.      Breath sounds: Normal breath sounds. No wheezing or rhonchi.   Skin:     General: Skin is warm.      Findings: No erythema or rash.   Neurological:      General: No focal deficit present.      Mental Status: He is alert. Mental status is at baseline.   Psychiatric:         Mood and Affect: Mood normal.         Behavior: Behavior normal.     WORKUP: Skin testing was negative with a small histamine control    ENVIRONMENTAL PERCUTANEOUS SKIN TESTING: ADULT  [place]  Environmental 1/3/2023   Consent Y   Ordering Physician Dr. Toure   Interpreting Physician Dr. Toure   Testing Technician Corina ANGUIANO RN   Location Back   Time start:  7:55 AM   Time End:  8:10 AM   Positive Control: Histatrol*ALK 1 mg/ml 3/4   Negative Control: 50% Glycerin 0   Cat Hair*ALK (10,000 BAU/ml) 0   AP Dog Hair/Dander (1:100 w/v) 0   Dust Mite p. 30,000 AU/ml 0   Dust Mite f. (30,000 AU/ml) 0   Dameon (W/F in millimeters) 0   Dl Grass (100,000 BAU/mL) 0   Red Cedar (W/F in millimeters) 0   Maple/[place] (W/F in millimeters) 0   Hackberry (W/F in millimeters) 0   [place] (W/F in millimeters) 0   [place] *ALK (W/F in millimeters) 0   American Elm (W/F in millimeters) 0   [place] (W/F in millimeters) 0   Black [place] (W/F in millimeters) 0   Birch Mix (W/F in millimeters) 0   [place] (W/F in millimeters) 0   Oak (W/F in millimeters) 0   Cocklebur (W/F in millimeters) 0   [place] (W/F in millimeters) 0   White Donn (W/F in millimeters) 0   Careless (W/F in millimeters) 0   Nettle (W/F in millimeters) 0   English Plantain (W/F in millimeters) 0   Kochia (W/F in millimeters) 0   Lamb's Quarter (W/F in millimeters) 0   Marshelder (W/F in millimeters) 0   Ragweed Mix* ALK (W/F in millimeters) 0   Russian Thistle (W/F in millimeters) 0   Sagebrush/Mugwort (W/F in millimeters) 0   Sheep Sorrel (W/F in millimeters) 0   Feather Mix* ALK (W/F in millimeters) 0   Penicillium Mix (1:10 w/v) 0   Curvularia spicifera (1:10 w/v) 0   Epicoccum (1:10 w/v) 0   Aspergillus fumigatus (1:10 w/v): 0   Alternaria tenius (1:10 w/v) 0   H. Cladosporium (1:10 w/v) 0   Phoma herbarum (1:10 w/v) 0      Verbal consent obtained for skin testing  ASSESSMENT/PLAN:  Ten Beckwith Haro is a 32 year old male seen today for symptoms concerning for eye itching and eye watering.  Wondering if there is an allergic component.  Skin testing was negative.  The histamine control was small so we will do additional blood testing.  Also  will refer to ophthalmology due to symptoms potentially starting after surgery.    1. Try Refresh eyedrops or other lubricating eyedrops twice daily  2. Will check labs for allergies also  3. Referred to Opthalmology for further evaluation.    If blood tests are positive will follow-up.      Thank you for allowing me to participate in the care of Ten IBRAHIM Tang Haro.      I spent 40 minutes on the date of the encounter doing chart review, history and exam, documentation and further coordination as noted above exclusive of separately reported interpretations    Julio Toure MD  Allergy/Immunology  [facility]

## 2023-01-03 NOTE — LETTER
1/3/2023         RE: Ten Haro  [id number]  [place] [address]        Dear Colleague,    Thank you for referring your patient, Ten Haro, to the [facility] [place]. Please see a copy of my visit note below.    Ten Haro was seen in the Allergy Clinic at [facility].    Ten Haro is a 32 year old male being seen today at the request of Nuria Mark MD in consultation for allergy symptoms.    Symptoms include eye itching and watering of his eyes year-round.  Initially thought his symptoms started around 3 to 4 years ago.  Upon further questioning he does believe that it happened after his LASIK surgery 7 years ago.  He feels that that his eyes have been more sensitive to wind which causes them to water more frequently.  He has rare rhinorrhea.    He has tried antihistamines which seems to cause more burning of his eyes.  Occasionally his eyelids will feel raw in nature but he does not have any vision changes.  He did feel like his eye symptoms improved while in [place].  He is a  so he does have a haile environment at work.  Symptoms occur at work and at home.  He does have 2 cats at home.  He is wondering if there is an allergic component.    He has not seen an ophthalmologist since his surgery.      Past Medical History:   Diagnosis Date     Non-recurrent unilateral inguinal hernia without obstruction or gangrene      Family History   Problem Relation Age of Onset     Hypertension Mother      Hypertension Father      Other Cancer Father      Melanoma Father      Asthma Sister      Skin Cancer Paternal Uncle      Skin Cancer Paternal Uncle      Past Surgical History:   Procedure Laterality Date     APPENDECTOMY      2002     DAVINCI XI HERNIORRHAPHY INGUINAL Left 08/18/2022    Procedure: Xi Robotic Assisted HERNIORRHAPHY, INGUINAL, LEFT;  Surgeon: Corrie Colon MD;  Location:  OR      HERNIA REPAIR  8/22       ENVIRONMENTAL HISTORY:   Pets inside the house include 2 cat(s).  Do you smoke cigarettes or other recreational drugs? No There is/are 0 smokers living in the house. The house does not have a damp basement.     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      Review of Systems   Constitutional: Negative for chills and fever.   HENT: Negative for ear pain and sore throat.    Eyes: Positive for pain, redness and itching. Negative for visual disturbance.   Respiratory: Negative for cough and shortness of breath.    Cardiovascular: Negative for chest pain and palpitations.   Gastrointestinal: Negative for abdominal pain and vomiting.   Genitourinary: Negative for dysuria and hematuria.   Musculoskeletal: Negative for arthralgias and back pain.   Skin: Negative for color change and rash.   Neurological: Negative for seizures and syncope.   All other systems reviewed and are negative.      No current outpatient medications on file.  No Known Allergies      EXAM:   /82   Pulse 79   Wt 77 kg (169 lb 12.8 oz)   SpO2 100%   BMI 24.42 kg/m      Physical Exam    Constitutional:       General: He is not in acute distress.     Appearance: Normal appearance. He is not ill-appearing.   HENT:      Head: Normocephalic and atraumatic.      Nose: Nose normal. No congestion or rhinorrhea.      Mouth/Throat:      Mouth: Mucous membranes are moist.      Pharynx: Oropharynx is clear. No posterior oropharyngeal erythema.   Eyes:      General:         Right eye: No discharge.         Left eye: No discharge.   Cardiovascular:      Rate and Rhythm: Normal rate and regular rhythm.      Heart sounds: Normal heart sounds.   Pulmonary:      Effort: Pulmonary effort is normal.      Breath sounds: Normal breath sounds. No wheezing or rhonchi.   Skin:     General: Skin is warm.      Findings: No erythema or rash.   Neurological:      General: No focal deficit present.      Mental  Status: He is alert. Mental status is at baseline.   Psychiatric:         Mood and Affect: Mood normal.         Behavior: Behavior normal.     WORKUP: Skin testing was negative with a small histamine control    ENVIRONMENTAL PERCUTANEOUS SKIN TESTING: ADULT  [place] [facility] 1/3/2023   Consent Y   Ordering Physician Dr. Toure   Interpreting Physician Dr. Toure   Testing Technician Corina ANGUIANO RN   Location Back   Time start:  7:55 AM   Time End:  8:10 AM   Positive Control: Histatrol*ALK 1 mg/ml 3/4   Negative Control: 50% Glycerin 0   Cat Hair*ALK (10,000 BAU/ml) 0   AP Dog Hair/Dander (1:100 w/v) 0   Dust Mite p. 30,000 AU/ml 0   Dust Mite f. (30,000 AU/ml) 0   Dameon (W/F in millimeters) 0   Dl Grass (100,000 BAU/mL) 0   Red Cedar (W/F in millimeters) 0   Maple/[place] (W/F in millimeters) 0   Hackberry (W/F in millimeters) 0   [place] (W/F in millimeters) 0   [place] *ALK (W/F in millimeters) 0   American Elm (W/F in millimeters) 0   [place] (W/F in millimeters) 0   Black [place] (W/F in millimeters) 0   Birch Mix (W/F in millimeters) 0   [place] (W/F in millimeters) 0   Oak (W/F in millimeters) 0   [place]le[place] (W/F in millimeters) 0   [place] (W/F in millimeters) 0   White [place]n (W/F in millimeters) 0   Careless (W/F in millimeters) 0   Nettle (W/F in millimeters) 0   English Plantain (W/F in millimeters) 0   Kochia (W/F in millimeters) 0   Lamb's Quarter (W/F in millimeters) 0   Marshelder (W/F in millimeters) 0   Ragweed Mix* ALK (W/F in millimeters) 0   Russian Thistle (W/F in millimeters) 0   Sagebrush/Mugwort (W/F in millimeters) 0   Sheep Sorrel (W/F in millimeters) 0   Feather Mix* ALK (W/F in millimeters) 0   Penicillium Mix (1:10 w/v) 0   Curvularia spicifera (1:10 w/v) 0   Epicoccum (1:10 w/v) 0   Aspergillus fumigatus (1:10 w/v): 0   Alternaria tenius (1:10 w/v) 0   H. Cladosporium (1:10 w/v) 0   Phoma herbarum (1:10 w/v) 0      Verbal consent obtained for skin  testing  ASSESSMENT/PLAN:  Ten Haro is a 32 year old male seen today for symptoms concerning for eye itching and eye watering.  Wondering if there is an allergic component.  Skin testing was negative.  The histamine control was small so we will do additional blood testing.  Also will refer to ophthalmology due to symptoms potentially starting after surgery.    1. Try Refresh eyedrops or other lubricating eyedrops twice daily  2. Will check labs for allergies also  3. Referred to Opthalmology for further evaluation.    If blood tests are positive will follow-up.      Thank you for allowing me to participate in the care of Ten Haro.      I spent 40 minutes on the date of the encounter doing chart review, history and exam, documentation and further coordination as noted above exclusive of separately reported interpretations    Julio Toure MD  Allergy/Immunology  [facility]      Per provider verbal order, placed Adult Environmental Panel scratch test. Once panels were placed, patient was monitored for 15 minutes in clinic.  Provider read test after 15 minutes.  Pt tolerated procedure well.  All questions and concerns were addressed at office visit.       JERRY ValdezN, RN                Again, thank you for allowing me to participate in the care of your patient.        Sincerely,        Julio Toure MD

## 2023-01-04 ENCOUNTER — OFFICE VISIT (OUTPATIENT)
Dept: OPTOMETRY | Facility: CLINIC | Age: 33
End: 2023-01-04
Attending: INTERNAL MEDICINE
Payer: COMMERCIAL

## 2023-01-04 ENCOUNTER — TRANSFERRED RECORDS (OUTPATIENT)
Dept: HEALTH INFORMATION MANAGEMENT | Facility: CLINIC | Age: 33
End: 2023-01-04

## 2023-01-04 DIAGNOSIS — H02.889 MEIBOMIAN GLAND DYSFUNCTION: Primary | ICD-10-CM

## 2023-01-04 DIAGNOSIS — H04.203 WATERY EYES: ICD-10-CM

## 2023-01-04 DIAGNOSIS — H01.006 BLEPHARITIS OF BOTH EYES, UNSPECIFIED EYELID, UNSPECIFIED TYPE: ICD-10-CM

## 2023-01-04 DIAGNOSIS — H01.003 BLEPHARITIS OF BOTH EYES, UNSPECIFIED EYELID, UNSPECIFIED TYPE: ICD-10-CM

## 2023-01-04 PROCEDURE — 92002 INTRM OPH EXAM NEW PATIENT: CPT | Performed by: OPTOMETRIST

## 2023-01-04 RX ORDER — ERYTHROMYCIN 5 MG/G
OINTMENT OPHTHALMIC
Qty: 3.5 G | Refills: 1 | Status: SHIPPED | OUTPATIENT
Start: 2023-01-04 | End: 2023-05-10

## 2023-01-04 ASSESSMENT — VISUAL ACUITY
OS_SC: 20/15
OS_SC+: -1
METHOD: SNELLEN - LINEAR
OD_SC: 20/15

## 2023-01-04 ASSESSMENT — EXTERNAL EXAM - RIGHT EYE: OD_EXAM: NORMAL

## 2023-01-04 ASSESSMENT — EXTERNAL EXAM - LEFT EYE: OS_EXAM: NORMAL

## 2023-01-04 NOTE — PATIENT INSTRUCTIONS
Mild blepharitis   Blepharitis is a chronic or long term inflammation of the eyelids and eyelashes. It affects all ages. Causes include poor eyelid hygiene, excess oil production, staph bacteria or an allergic reaction.    Blepharitis may appear as greasy flakes on the base of the eyelashes, crusting of eyelashes and mild redness of the eyelid margins.  Sometimes it may result in an acute infection of a gland in the eyelid called a stye and sometimes painless firm nodules can form in the eyelid that do not resolve on their own and must be surgically removed.    Treatment includes warm compresses and lid hygiene with an antimicrobial lid scrub and sometimes a prescription ointment is needed.      Hot compresses/ warm soaks  Warm compresses are very beneficial to the normal functioning of the eye.   They help loosen up the eyelid debris that has collected on the eyelash follicles.  Overabundance of bacterial microorganisms along the eyelashes and lid margins induce stress on the tear film and promote inflammation.  Regular lid hygiene helps diminish the bacterial population to prevent inflammation and infection.  Cleanse lids once daily with a lid cleansing product as directed such as Ocusoft or Sterilid which can be purchased at most pharmacies. Eyelid cleansers maintain clean and healthy eyelid margins. Ocusoft or sterilid are commercial products that are available as individual wrapped cleansing pads.  Diluted baby shampoo will work, but not as well and is a cheaper alternative.    Directions for warm soaks  There are few methods for hot compresses. Moisten a washcloth with hot water, or microwave for 10 seconds, being careful to not get the cloth too hot.   Then put the washcloth onto your eyelids for 5 minutes. It will cool quickly so a rice pack or eyemask that can be heated and laid on top of the washcloth will help retain the heat.      Meibomian gland dysfunction or Posterior Blepharitis, is characterized by  inflammation along both the uppper and lower eyelid margins. A single row of these glands is present in each lid with openings along the lid margins.  It is often found in association with skin conditions such as rosacea and seborrheic dermatitis.    Symptoms include:  ?Red eyes  ?Gritty or burning sensation  ?Excessive tearing  ?Itchy eyelids  ?Red, swollen eyelids  ?Crusting or matting of eyelashes in the morning  ?Light sensitivity  ?Blurred vision    It is important to keep cosmetics from blocking these oil glands. If blocked, they do not   excrete oil into the tear film, which causes the tears to evaporate quickly.   This may result in watery eyes.  There is also an increase of bacterial growth when the tear film is unstable, leading to further ocular surface inflammation.    Treatment:  1. Warm compresses for 5-10 minutes twice daily     2.  Keep the eyelid margins clean by using a commercial eye scrub or mild baby shampoo on a washcloth 1-2x daily    3. Use preservative free artificial tears 4-8x daily     For warm compresses    Moisten a washcloth with hot water, or microwave for 10 seconds, being careful to not get the cloth too hot.   Then put the washcloth onto your eyelids for 5 minutes. It will cool quickly so a rice pack or eyemask that can be heated and laid on top of the washcloth will help retain the heat.    Omega 3 fatty acid supplements taken once to twice daily and artificial tears such as Soothe xp, Refresh optive , Retaine and systane balance are also an additional treatment to control inflammation and help soothe your eyes.     DRY EYE TREATMENT    I recommend using artificial tears for your dry eye. There are over the counter drops that work well and may be used up to 4x daily. ( systane balance or ultra, blink, refresh optive, soothe xp) stay away from any red eye relief products such as visine and clear eyes.     If you need more than 4 drops daily, use a preservative free product which  come in individual vials and may be used for 24 hours and discarded.   The more severe the dry eye, the more frequent instillation of artificial tears  that are needed to reduce irritation/ inflammation. (Sometimes every 1-2 hours for a couple of days).  You can also add a lubricating ointment in the lower lid at bedtime. ( over the counter refresh pm, genteal gel, lacrilube etc.)    Artificial tears work best as a preventative and not as well after your eyes are starting to bother you and/ or are red.  It may take 4- 6 weeks of using the drops before you notice improvement.  If after that time you are still having problems schedule an appointment for an evaluation to discuss different treatments.    Dry eyes are a chronic condition and you may have more symptoms at certain times of the year.      Additional recommended treatment:  Warm compresses once to twice daily for 5-10 minutes    Directions for warm soaks  There are few methods for hot compresses. Moisten a washcloth with hot water, or microwave for 10 seconds, being careful to not get the cloth too hot.   Then put the washcloth onto your eyelids for 5 minutes. It will cool quickly so a rice pack or eyemask that can be heated and laid on top of the washcloth will help retain the heat.      Overabundance of bacterial microorganisms along the eyelashes and lid margins induce stress on the tear film and promote inflammation.  Regular lid hygiene helps diminish the bacterial population to prevent inflammation and infection.  Use a warm compress to loosen crusts   Cleanse lids once daily with a lid cleansing product as directed such as Ocusoft or Sterilid which can be purchased at most pharmacies. Diluted baby shampoo will also work, but not as well as it dries the skin and can irritate eyelids.  Hypo chlor spray may also be used on closed lids.      Omega 3 fatty acid supplements taken 1-2x daily  Recommend  at least  2000mg omega 3  800 EPA  600 DHA    Blink  regularly  Stay hydrated  Increase humidity  Wear sunglasses   Avoid direct exposure to forced air--turn air vents in the car away and keep fans from blowing directly on your face

## 2023-01-04 NOTE — LETTER
1/4/2023         RE: Ten Haro  [id number]  [place] [facility] [place] [place] [address]        Dear Colleague,    Thank you for referring your patient, Ten Haro, to the [facility]. Please see a copy of my visit note below.    Chief Complaint   Patient presents with     watery eyes      Eyelid margins get red and irritated   Referral from allergy doctor, yesterday did not have allergies     History of lasik this is ongoing since surgery 7 y ago , has not had an exam in a while   Eyelid margins get red and irritated   Crusting in the morning  Watery eyes, eyes are not red just lids, denies itching        Medical, surgical and family histories reviewed and updated 1/4/2023.       OBJECTIVE: See Ophthalmology exam    ASSESSMENT:    ICD-10-CM    1. Meibomian gland dysfunction  H02.889       2. Watery eyes  H04.203 Adult Eye  Referral      3. Pruritus of both eyes  L29.8 Adult Eye  Referral      4. Blepharitis of both eyes, unspecified eyelid, unspecified type  H01.003 erythromycin (ROMYCIN) 5 MG/GM ophthalmic ointment    H01.006           PLAN:   Ongoing eye exams recommended after lasik  Warm compresses/ lid hygiene/ artificial tears three times daily ongoing  Add erythromycin ointment to clean lid margins at bedtime for one week and discontinue     Sindi Xiao OD       Again, thank you for allowing me to participate in the care of your patient.        Sincerely,        Sindi Xiao, OD

## 2023-01-05 LAB
CAT DANDER IGG QN: <0.1 KU(A)/L
D FARINAE IGE QN: <0.1 KU(A)/L
D PTERONYSS IGE QN: <0.1 KU(A)/L

## 2023-05-09 ASSESSMENT — ENCOUNTER SYMPTOMS
SORE THROAT: 0
CHILLS: 0
HEMATOCHEZIA: 0
FREQUENCY: 0
DYSURIA: 0
CONSTIPATION: 0
COUGH: 0
DIZZINESS: 0
JOINT SWELLING: 0
HEARTBURN: 0
PARESTHESIAS: 0
MYALGIAS: 1
ABDOMINAL PAIN: 0
FEVER: 0
HEMATURIA: 0
SHORTNESS OF BREATH: 0
DIARRHEA: 0
PALPITATIONS: 0
WEAKNESS: 0
HEADACHES: 0
ARTHRALGIAS: 1
EYE PAIN: 0
NAUSEA: 0

## 2023-05-10 ENCOUNTER — OFFICE VISIT (OUTPATIENT)
Dept: FAMILY MEDICINE | Facility: CLINIC | Age: 33
End: 2023-05-10
Payer: COMMERCIAL

## 2023-05-10 VITALS
HEIGHT: 71 IN | DIASTOLIC BLOOD PRESSURE: 86 MMHG | WEIGHT: 171 LBS | BODY MASS INDEX: 23.94 KG/M2 | TEMPERATURE: 97.7 F | HEART RATE: 69 BPM | RESPIRATION RATE: 16 BRPM | OXYGEN SATURATION: 100 % | SYSTOLIC BLOOD PRESSURE: 128 MMHG

## 2023-05-10 DIAGNOSIS — S76.012A STRAIN OF FLEXOR MUSCLE OF LEFT HIP, INITIAL ENCOUNTER: ICD-10-CM

## 2023-05-10 DIAGNOSIS — M53.3 SI (SACROILIAC) JOINT DYSFUNCTION: ICD-10-CM

## 2023-05-10 DIAGNOSIS — Z80.8 FH: MELANOMA: ICD-10-CM

## 2023-05-10 DIAGNOSIS — Z13.220 LIPID SCREENING: ICD-10-CM

## 2023-05-10 DIAGNOSIS — Z00.00 VISIT FOR PREVENTIVE HEALTH EXAMINATION: Primary | ICD-10-CM

## 2023-05-10 DIAGNOSIS — Z13.1 SCREENING FOR DIABETES MELLITUS: ICD-10-CM

## 2023-05-10 LAB
CHOLEST SERPL-MCNC: 166 MG/DL
HBA1C MFR BLD: 5.1 % (ref 0–5.6)
HDLC SERPL-MCNC: 37 MG/DL
LDLC SERPL CALC-MCNC: 107 MG/DL
NONHDLC SERPL-MCNC: 129 MG/DL
TRIGL SERPL-MCNC: 108 MG/DL

## 2023-05-10 PROCEDURE — 36415 COLL VENOUS BLD VENIPUNCTURE: CPT | Performed by: FAMILY MEDICINE

## 2023-05-10 PROCEDURE — 80061 LIPID PANEL: CPT | Performed by: FAMILY MEDICINE

## 2023-05-10 PROCEDURE — 91312 COVID-19 BIVALENT 12+ (PFIZER): CPT | Performed by: FAMILY MEDICINE

## 2023-05-10 PROCEDURE — 90715 TDAP VACCINE 7 YRS/> IM: CPT | Performed by: FAMILY MEDICINE

## 2023-05-10 PROCEDURE — 99213 OFFICE O/P EST LOW 20 MIN: CPT | Mod: 25 | Performed by: FAMILY MEDICINE

## 2023-05-10 PROCEDURE — 83036 HEMOGLOBIN GLYCOSYLATED A1C: CPT | Performed by: FAMILY MEDICINE

## 2023-05-10 PROCEDURE — 99395 PREV VISIT EST AGE 18-39: CPT | Mod: 25 | Performed by: FAMILY MEDICINE

## 2023-05-10 PROCEDURE — 90471 IMMUNIZATION ADMIN: CPT | Performed by: FAMILY MEDICINE

## 2023-05-10 ASSESSMENT — PAIN SCALES - GENERAL: PAINLEVEL: MILD PAIN (2)

## 2023-05-10 NOTE — PROGRESS NOTES
Assessment/Plan    Preventive.  See below orders for screening tests and immunizations.  -denies STI concerns    Left inguinal / left lower back pain. Dad had early-onset hip OA, though pt's description of dad's symptoms are suggestive of untreated DDH or SCFE. Exam today does not seem c/w OA. Suspect hip flexor strain and SI joint dysfunction. Also possible lumbar radiculopathy.  -start PT    F/u annually.    Orders Placed This Encounter   Procedures     TDAP VACCINE (Adacel, Boostrix)     COVID-19 BIVALENT 12+ (PFIZER)     Lipid panel reflex to direct LDL Fasting     Hemoglobin A1c     Physical Therapy Referral     ----  Chief complaint: Physical and Hip Pain    Social History     Social History Narrative    Updated 5/10/2023: Moved to [place] from [place] at age 8.  Lives with wife and 3 children (born around 2018, 2020, 2022).  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     Healthy Habits:     Getting at least 3 servings of Calcium per day:  NO    Bi-annual eye exam:  Yes    Dental care twice a year:  Yes    Sleep apnea or symptoms of sleep apnea:  None    Diet:  Regular (no restrictions)    Frequency of exercise:  None    Taking medications regularly:  Yes    Medication side effects:  Not applicable    PHQ-2 Total Score: 2    Hx sciatica, presented as leg low back pain  Went to chiropractor, had x-ray  Was given platform on right for limb length discrepancy    Left inguinal discomfort.  This started around 2020.  Patient was diagnosed with an inguinal hernia at that time.  This was repaired in fall 2022.  Unfortunately, patient feels that hernia repair did not resolve the pain.    Pain radiates to left lower back and posterior left thigh.  It sometimes radiates to the anterior left thigh.  Occasional paresthesias of the left leg.  No left leg weakness.  No bladder or bowel incontinence.    Patient denies a personal history of vertebral fracture or back surgery.    Sciatica is listed on problem list.  When asked  "about this, patient describes a left lower back pain episode that prompted a visit to a chiropractor.  He was diagnosed with a leg length discrepancy and given a platform in his right shoe.    Patient is right-handed.    He notes that his father had chronic hip issues and a significant leg length discrepancy.  His father told him that his father's symptoms started abruptly at a young age.    Exam  /86 (BP Location: Left arm, Patient Position: Sitting, Cuff Size: Adult Regular)   Pulse 69   Temp 97.7  F (36.5  C) (Temporal)   Resp 16   Ht 1.794 m (5' 10.63\")   Wt 77.6 kg (171 lb)   SpO2 100%   BMI 24.10 kg/m      oropharynx without abnormal masses  no cervical adenopathy  lung fields CTAB  heart with regular rate and rhythm, no murmurs  no lower leg edema    abdomen soft, nontender, no palpable masses  No inguinal hernia on left    No lumbar spinous process tenderness  Mild left SI joint tenderness  Good passive ROM in hips b/l  Pain w/ resisted hip flexion on left  5/5 strength b/l with flexion at hip, flexion & extension at knee, and dorsiflexion & plantarflexion at ankle  Negative straight leg raise b/l  "

## 2023-12-10 ENCOUNTER — OFFICE VISIT (OUTPATIENT)
Dept: URGENT CARE | Facility: URGENT CARE | Age: 33
End: 2023-12-10
Payer: COMMERCIAL

## 2023-12-10 VITALS
WEIGHT: 165 LBS | DIASTOLIC BLOOD PRESSURE: 82 MMHG | SYSTOLIC BLOOD PRESSURE: 135 MMHG | TEMPERATURE: 98.3 F | OXYGEN SATURATION: 99 % | BODY MASS INDEX: 23.25 KG/M2 | HEART RATE: 91 BPM

## 2023-12-10 DIAGNOSIS — H10.31 ACUTE BACTERIAL CONJUNCTIVITIS OF RIGHT EYE: Primary | ICD-10-CM

## 2023-12-10 PROCEDURE — 99213 OFFICE O/P EST LOW 20 MIN: CPT | Performed by: INTERNAL MEDICINE

## 2023-12-10 RX ORDER — POLYMYXIN B SULFATE AND TRIMETHOPRIM 1; 10000 MG/ML; [USP'U]/ML
1-2 SOLUTION OPHTHALMIC EVERY 4 HOURS
Qty: 10 ML | Refills: 0 | Status: SHIPPED | OUTPATIENT
Start: 2023-12-10 | End: 2024-05-22

## 2023-12-10 NOTE — PROGRESS NOTES
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  He states that two of his children had pink eye last week.  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  Getting some crusting and green mucoid discharge.     Review of Systems   Constitutional, HEENT, cardiovascular, pulmonary, gi and gu systems are negative, except as otherwise noted.      Objective    /82   Pulse 91   Temp 98.3  F (36.8  C) (Oral)   Wt 74.8 kg (165 lb)   SpO2 99%   BMI 23.25 kg/m    Body mass index is 23.25 kg/m .  Physical Exam   GENERAL APPEARANCE: healthy, alert, and no distress  EYES: injection of the conjunctiva and mucopurulent discharge on the right side; left eye is normal; no visible foreign body; upper lid is everted and no visible foreign body is identified.

## 2024-03-06 NOTE — PROGRESS NOTES
Chief Complaint   Patient presents with     watery eyes      Eyelid margins get red and irritated   Referral from allergy doctor, yesterday did not have allergies     History of lasik this is ongoing since surgery 7 y ago , has not had an exam in a while   Eyelid margins get red and irritated   Crusting in the morning  Watery eyes, eyes are not red just lids, denies itching        Medical, surgical and family histories reviewed and updated 1/4/2023.       OBJECTIVE: See Ophthalmology exam    ASSESSMENT:    ICD-10-CM    1. Meibomian gland dysfunction  H02.889       2. Watery eyes  H04.203 Adult Eye  Referral      3. Pruritus of both eyes  L29.8 Adult Eye  Referral      4. Blepharitis of both eyes, unspecified eyelid, unspecified type  H01.003 erythromycin (ROMYCIN) 5 MG/GM ophthalmic ointment    H01.006           PLAN:   Ongoing eye exams recommended after lasik  Warm compresses/ lid hygiene/ artificial tears three times daily ongoing  Add erythromycin ointment to clean lid margins at bedtime for one week and discontinue     Sindi Xiao OD   
For information on Fall & Injury Prevention, visit: [email or web address] OR  [email or web address] OR  https://www.cdc.gov/steadi/patient.html

## 2024-05-21 SDOH — HEALTH STABILITY: PHYSICAL HEALTH: ON AVERAGE, HOW MANY MINUTES DO YOU ENGAGE IN EXERCISE AT THIS LEVEL?: 60 MIN

## 2024-05-21 SDOH — HEALTH STABILITY: PHYSICAL HEALTH: ON AVERAGE, HOW MANY DAYS PER WEEK DO YOU ENGAGE IN MODERATE TO STRENUOUS EXERCISE (LIKE A BRISK WALK)?: 5 DAYS

## 2024-05-21 ASSESSMENT — SOCIAL DETERMINANTS OF HEALTH (SDOH): HOW OFTEN DO YOU GET TOGETHER WITH FRIENDS OR RELATIVES?: ONCE A WEEK

## 2024-05-22 ENCOUNTER — OFFICE VISIT (OUTPATIENT)
Dept: FAMILY MEDICINE | Facility: CLINIC | Age: 34
End: 2024-05-22
Attending: FAMILY MEDICINE
Payer: COMMERCIAL

## 2024-05-22 VITALS
HEART RATE: 72 BPM | SYSTOLIC BLOOD PRESSURE: 123 MMHG | HEIGHT: 71 IN | DIASTOLIC BLOOD PRESSURE: 84 MMHG | BODY MASS INDEX: 22.96 KG/M2 | OXYGEN SATURATION: 98 % | TEMPERATURE: 96.9 F | WEIGHT: 164 LBS | RESPIRATION RATE: 14 BRPM

## 2024-05-22 DIAGNOSIS — M53.3 SI (SACROILIAC) JOINT DYSFUNCTION: ICD-10-CM

## 2024-05-22 DIAGNOSIS — Z00.00 ROUTINE HISTORY AND PHYSICAL EXAMINATION OF ADULT: Primary | ICD-10-CM

## 2024-05-22 PROCEDURE — 99395 PREV VISIT EST AGE 18-39: CPT | Performed by: INTERNAL MEDICINE

## 2024-05-22 ASSESSMENT — PAIN SCALES - GENERAL: PAINLEVEL: NO PAIN (0)

## 2024-05-22 NOTE — PROGRESS NOTES
Preventive Care Visit  [facility]  Nuria Mark MD, Internal Medicine  May 22, 2024      Assessment & Plan     SI (sacroiliac) joint dysfunction  - Physical Therapy  Referral  - Orthopedic  Referral    Routine history and physical examination of adult  - REVIEW OF HEALTH MAINTENANCE PROTOCOL ORDERS      Patient has been advised of split billing requirements and indicates understanding: Yes        Counseling  Appropriate preventive services were discussed with this patient, including applicable screening as appropriate for fall prevention, nutrition, physical activity, Tobacco-use cessation, weight loss and cognition.  Checklist reviewing preventive services available has been given to the patient.  Reviewed patient's diet, addressing concerns and/or questions.   He is at risk for psychosocial distress and has been provided with information to reduce risk.       FUTURE APPOINTMENTS:       - Follow-up visit in 1 year    Dagmar Caal is a 33 year old, presenting for the following:  Physical      Health Care Directive  Patient does not have a Health Care Directive or Living Will: Discussed advance care planning with patient; information given to patient to review.    HPI        5/21/2024   General Health   How would you rate your overall physical health? Good   Feel stress (tense, anxious, or unable to sleep) Only a little   (!) STRESS CONCERN      5/21/2024   Nutrition   Three or more servings of calcium each day? (!) NO   Diet: Regular (no restrictions)   How many servings of fruit and vegetables per day? (!) 0-1   How many sweetened beverages each day? 0-1         5/21/2024   Exercise   Days per week of moderate/strenous exercise 5 days   Average minutes spent exercising at this level 60 min         5/21/2024   Social Factors   Frequency of gathering with friends or relatives Once a week   Worry food won't last until get money to buy more No   Food not last or not have  "enough money for food? No   Do you have housing?  Yes   Are you worried about losing your housing? No   Lack of transportation? No   Unable to get utilities (heat,electricity)? No         5/21/2024   Dental   Dentist two times every year? Yes         5/21/2024   TB Screening   Were you born outside of the [place]? Yes         Today's PHQ-2 Score:       5/21/2024    10:06 PM   PHQ-2 ( 1999 [employer])   Q1: Little interest or pleasure in doing things 1   Q2: Feeling down, depressed or hopeless 0   PHQ-2 Score 1   Q1: Little interest or pleasure in doing things Several days   Q2: Feeling down, depressed or hopeless Not at all   PHQ-2 Score 1           5/21/2024   Substance Use   Alcohol more than 3/day or more than 7/wk No   Do you use any other substances recreationally? (!) CANNABIS PRODUCTS     Social History     Tobacco Use    Smoking status: Never    Smokeless tobacco: Never   Vaping Use    Vaping status: Never Used   Substance Use Topics    Alcohol use: Yes     Comment: 1 drink per month    Drug use: Not Currently           5/21/2024   STI Screening   New sexual partner(s) since last STI/HIV test? No         5/21/2024   Contraception/Family Planning   Questions about contraception or family planning No       Reviewed and updated as needed this visit by Provider                    Past Medical History:   Diagnosis Date    Non-recurrent unilateral inguinal hernia without obstruction or gangrene          Review of Systems  Constitutional, HEENT, cardiovascular, pulmonary, GI, , musculoskeletal, neuro, skin, endocrine and psych systems are negative, except as otherwise noted.     Objective    Exam  /84   Pulse 72   Temp 96.9  F (36.1  C) (Temporal)   Resp 14   Ht 1.793 m (5' 10.6\")   Wt 74.4 kg (164 lb)   SpO2 98%   BMI 23.13 kg/m     Estimated body mass index is 23.25 kg/m  as calculated from the following:    Height as of 5/10/23: 1.794 m (5' 10.63\").    Weight as of 12/10/23: 74.8 kg (165 lb).    Physical " Exam  GENERAL: alert and no distress  EYES: Eyes grossly normal to inspection, conjunctivae and sclerae normal  HENT: ear canals and TM's normal, nose and mouth without ulcers or lesions  NECK:  no asymmetry, masses, or scars  RESP: lungs clear to auscultation - no rales, rhonchi or wheezes  CV: regular rate and rhythm, normal S1 S2  ABDOMEN: soft, nontender, bowel sounds normal  MS: chronic left hip pains of 2 years duration again noted, no edema  SKIN: no suspicious lesions or rashes  NEURO: Normal strength and tone, mentation intact and speech normal  PSYCH: mentation appears normal, affect normal/bright        Signed Electronically by: Nuria Mark MD

## 2024-05-30 NOTE — TELEPHONE ENCOUNTER
SPINE PATIENTS - NEW PROTOCOL PREVISIT    RECORDS RECEIVED FROM: Referred by Nuria Mark MD   REASON FOR VISIT: (L) SI joint dysfunction   PROVIDER: charly   DATE OF APPT: 06/11/2024   NOTES (FOR ALL VISITS) STATUS DETAILS   OFFICE NOTE from referring provider Internal Notes in chart   OFFICE NOTE from other specialist N/A    DISCHARGE SUMMARY from hospital N/A    DISCHARGE REPORT from ER N/A    OPERATIVE REPORT N/A    EMG REPORT N/A    MEDICATION LIST N/A    IMAGING  (FOR ALL VISITS)     MRI (HEAD, NECK, SPINE) N/A    XRAY (SPINE) *NEUROSURGERY* Received XR Lumbar 01/04/2023 w/[facility]   CT (HEAD, NECK, SPINE) N/A

## 2024-06-09 ENCOUNTER — OFFICE VISIT (OUTPATIENT)
Dept: URGENT CARE | Facility: URGENT CARE | Age: 34
End: 2024-06-09
Payer: COMMERCIAL

## 2024-06-09 VITALS
RESPIRATION RATE: 16 BRPM | BODY MASS INDEX: 23.63 KG/M2 | WEIGHT: 167.5 LBS | SYSTOLIC BLOOD PRESSURE: 122 MMHG | OXYGEN SATURATION: 99 % | DIASTOLIC BLOOD PRESSURE: 84 MMHG | HEART RATE: 66 BPM | TEMPERATURE: 98.1 F

## 2024-06-09 DIAGNOSIS — H10.31 ACUTE CONJUNCTIVITIS OF RIGHT EYE, UNSPECIFIED ACUTE CONJUNCTIVITIS TYPE: Primary | ICD-10-CM

## 2024-06-09 PROCEDURE — 99213 OFFICE O/P EST LOW 20 MIN: CPT | Performed by: PREVENTIVE MEDICINE

## 2024-06-09 RX ORDER — OFLOXACIN 3 MG/ML
1 SOLUTION/ DROPS OPHTHALMIC 4 TIMES DAILY
Qty: 10 ML | Refills: 0 | Status: SHIPPED | OUTPATIENT
Start: 2024-06-09 | End: 2024-06-16

## 2024-06-09 RX ORDER — POLYMYXIN B SULFATE AND TRIMETHOPRIM 1; 10000 MG/ML; [USP'U]/ML
1 SOLUTION OPHTHALMIC EVERY 6 HOURS
Qty: 10 ML | Refills: 0 | Status: SHIPPED | OUTPATIENT
Start: 2024-06-09 | End: 2024-06-09

## 2024-06-09 NOTE — PROGRESS NOTES
Assessment & Plan     (H10.31) Acute conjunctivitis of right eye, unspecified acute conjunctivitis type  (primary encounter diagnosis)  Plan: ofloxacin (OCUFLOX) 0.3 % ophthalmic solution,     Ofloxacin for one week    Follow up in 10-14 days if not improving or sooner as needed       31 minutes spent by me on the date of the encounter doing chart review, history and exam, documentation and further activities per the note        No follow-ups on file.    Brenden Xiao MD  [facility]    Subjective     Ten Haro is a 33 year old year old male who presents to clinic today for the following health issues:    Patient presents with:  Eye Problem: 34 yo M presents with the following complaint right eye is red awoke with matting onset Saturday    This is a 34 yo man who has had right eye irritation, discharge and thick discharge for 2 days.      No congestion.  No contact use.  No known sick contacts.  No fever or chills.  Otherwise feels well.    Patient Active Problem List   Diagnosis    History of sciatica    FH: melanoma       No current outpatient medications on file.     No current facility-administered medications for this visit.       Past Medical History:   Diagnosis Date    Non-recurrent unilateral inguinal hernia without obstruction or gangrene        Social History   reports that he has never smoked. He has never used smokeless tobacco. He reports current alcohol use. He reports that he does not currently use drugs after having used the following drugs: Marijuana.    Family History   Problem Relation Age of Onset    Hypertension Mother     Hypertension Father     Melanoma Father     Arthritis Father         hip, early-onset    Other Cancer Father     Asthma Sister     Skin Cancer Paternal Uncle     Skin Cancer Paternal Uncle     Prostate Cancer No family hx of     Colorectal Cancer No family hx of        Review of Systems  Constitutional, HEENT, cardiovascular,  pulmonary, GI, , musculoskeletal, neuro, skin, endocrine and psych systems are negative, except as otherwise noted.      Objective    /84   Pulse 66   Temp 98.1  F (36.7  C)   Resp 16   Wt 76 kg (167 lb 8 oz)   SpO2 99%   BMI 23.63 kg/m    Physical Exam   GENERAL: alert and no distress  EYES: l Eye grossly normal to inspection, PERRL, eomi, r eye with conjunctival injection and discharge noted, no hyphema or hypopyon.  HENT: ear canals and TM's normal, nose and mouth without ulcers or lesions  NECK: no adenopathy, no asymmetry, masses, or scars  RESP: lungs clear to auscultation - no rales, rhonchi or wheezes  CV: regular rate and rhythm, normal S1 S2, no S3 or S4, no murmur, click or rub, no peripheral edema  ABDOMEN: soft, nontender, no hepatosplenomegaly, no masses and bowel sounds normal  MS: no gross musculoskeletal defects noted, no edema  SKIN: no suspicious lesions or rashes  NEURO: Normal strength and tone, mentation intact and speech normal  PSYCH: mentation appears normal, affect normal/bright

## 2024-06-11 ENCOUNTER — PRE VISIT (OUTPATIENT)
Dept: NEUROSURGERY | Facility: CLINIC | Age: 34
End: 2024-06-11
Payer: COMMERCIAL

## 2024-06-11 ENCOUNTER — OFFICE VISIT (OUTPATIENT)
Dept: NEUROSURGERY | Facility: CLINIC | Age: 34
End: 2024-06-11
Attending: PHYSICIAN ASSISTANT
Payer: COMMERCIAL

## 2024-06-11 ENCOUNTER — ANCILLARY PROCEDURE (OUTPATIENT)
Dept: GENERAL RADIOLOGY | Facility: CLINIC | Age: 34
End: 2024-06-11
Attending: PHYSICIAN ASSISTANT
Payer: COMMERCIAL

## 2024-06-11 VITALS — DIASTOLIC BLOOD PRESSURE: 80 MMHG | OXYGEN SATURATION: 99 % | HEART RATE: 77 BPM | SYSTOLIC BLOOD PRESSURE: 131 MMHG

## 2024-06-11 DIAGNOSIS — M53.3 SI (SACROILIAC) JOINT DYSFUNCTION: ICD-10-CM

## 2024-06-11 PROCEDURE — 72202 X-RAY EXAM SI JOINTS 3/> VWS: CPT | Mod: TC | Performed by: RADIOLOGY

## 2024-06-11 PROCEDURE — 99203 OFFICE O/P NEW LOW 30 MIN: CPT | Performed by: PHYSICIAN ASSISTANT

## 2024-06-11 PROCEDURE — 99213 OFFICE O/P EST LOW 20 MIN: CPT | Performed by: PHYSICIAN ASSISTANT

## 2024-06-11 ASSESSMENT — PAIN SCALES - GENERAL: PAINLEVEL: MILD PAIN (3)

## 2024-06-11 NOTE — NURSING NOTE
"Ten Haro is a 33 year old male who presents for:  Chief Complaint   Patient presents with    Consult     SI joint dysfunction. Pain lvl 3. Sometimes pain extends to knee.        Initial Vitals:  /80   Pulse 77   SpO2 99%  Estimated body mass index is 23.63 kg/m  as calculated from the following:    Height as of 5/22/24: 5' 10.6\" (1.793 m).    Weight as of 6/9/24: 167 lb 8 oz (76 kg).. There is no height or weight on file to calculate BSA. BP completed using cuff size: regular  Mild Pain (3)        Sinai Santana    " 88

## 2024-06-11 NOTE — PROGRESS NOTES
"NEUROSURGERY CLINIC CONSULT NOTE     DATE OF VISIT: 6/11/2024     SUBJECTIVE:     Ten Haro is a pleasant 33 year old male who presents to the clinic today for consultation on left SI joint pain. He is referred to the Neurosurgery Clinic by Dr. Mark in Primary Care.     Today, he reports a four-year history of symptoms. He describes a daily with fluctuating intensity, sharp, aching pain that initiates in the left low lumbar / sacroiliac region and radiates to his left testicle. He states that there is an audible \"pop\" at times. He has had a hernia repair for this which did not provide any alleviation. This pain is not accompanied by paresthesia, numbness or perceived weakness in the same distribution. Mobility seems to aggravate the symptoms, while alleviation is obtained by rest. No mechanism of injury such as trauma or a fall is associated with the onset of the pain. There are no bowel or bladder changes. He denies saddle anesthesia. He denies changes in gait, instability, or falling episodes.     He has participated in conservative therapies to include physical therapy and chiropractic care.        Current Outpatient Medications:     ofloxacin (OCUFLOX) 0.3 % ophthalmic solution, Place 1 drop into the right eye 4 times daily for 7 days, Disp: 10 mL, Rfl: 0     Allergies   Allergen Reactions    No Known Allergies         Past Medical History:   Diagnosis Date    Non-recurrent unilateral inguinal hernia without obstruction or gangrene         ROS: 10 point review of symptoms are negative other than the symptoms noted above in the HPI.     Family History has been reviewed with the patient, there are no pertinent findings to presenting concern.     Past Surgical History:   Procedure Laterality Date    APPENDECTOMY      2002    DAVINCI XI HERNIORRHAPHY INGUINAL Left 08/18/2022    Procedure: Xi Robotic Assisted HERNIORRHAPHY, INGUINAL, LEFT;  Surgeon: Corrie Colon MD;  Location: [facility] OR    HERNIA REPAIR "  8/22        Social History     Tobacco Use    Smoking status: Never    Smokeless tobacco: Never   Vaping Use    Vaping status: Never Used   Substance Use Topics    Alcohol use: Yes     Comment: 1 drink per month    Drug use: Not Currently     Types: Marijuana        OBJECTIVE:   /80   Pulse 77   SpO2 99%    There is no height or weight on file to calculate BMI.     Imaging:     Lumbar x-ray at [facility] from 01/2023.     Full radiological report in chart. Imaging was reviewed with with patient today.     Exam:     Patient appears comfortable, conversational, and in no apparent distress.   Head: Normocephalic, without obvious abnormality, atraumatic, no facial asymmetry.   Eyes: conjunctivae/corneas clear. PERRL, EOM's intact.   Throat: lips, mucosa, and tongue normal; teeth and gums normal.   Neck: supple, symmetrical, trachea midline, no adenopathy and thyroid: not enlarged, symmetric, no tenderness/mass/nodules.   Lungs: clear to auscultation bilaterally.   Heart: regular rate and rhythm.   Abdomen: soft, non-tender; bowel sounds normal; no masses, no organomegaly.   Pulses: 2+ and symmetric.   Skin: Skin color, texture, turgor normal. No rashes or lesions.     CN II-XII grossly intact, alert and appropriate with conversation and following commands.   Gait is non-antalgic. Able to tandem walk. Able to walk on toes and heels without difficulty.   Cervical spine is non tender to palpation. Appropriate range of motion of neck, not concerning for lhermitte's phenomenon.   Bilateral bicep 2/4 and tricep reflexes 1/4. Sensation intact throughout upper extremities.     UE muscle strength  Right  Left    Deltoid  5/5  5/5    Biceps  5/5  5/5    Triceps  5/5  5/5    Hand intrinsics  5/5  5/5    Hand grasp  5/5  5/5    Castellon signs  neg  neg      Lumbar spine is non tender to palpation.  Intact sensation throughout lower extremities.   Bilateral patellar 2/4 and achilles reflex 2/4. Negative for pain with straight leg  "raise.     LE muscle strength  Right  Left    Iliopsoas (hip flexion)  5/5  5/5    Quad (knee extension)  5/5  5/5    Hamstring (knee flexion)  5/5  5/5    Gastrocnemius (PF)  5/5  5/5    Tibialis Ant. (DF)  5/5  5/5    EHL  5/5  5/5      Negative Babinski bilaterally. Negative for clonus.  Left SI joint is painful to palpation.    + Edison test  + Thigh Thrust test  + Distraction test  Calves are soft and non-tender bilaterally.       ASSESSMENT/PLAN:     Ten Haro is a 33 year old male who presents to the clinic for consultation on what sounds like left SI joint dysfunction. He describes a daily with fluctuating intensity, sharp, aching pain that initiates in the left low lumbar / sacroiliac region and radiates to his left testicle. He states that there is an audible \"pop\" at times. He has had a hernia repair for this which did not provide any alleviation. This pain is not accompanied by paresthesia, numbness or perceived weakness in the same distribution. He does not have any recent or dedicated imaging for this. He did have a hernia repair for this pain which did not provided any alleviation. On exam, he is noted to have appropriate strength, sensation and range of motion. Left SI joint is painful to palpation. He has attempted conservative management with primarily chiropractic care, without resolution of symptoms.     Based on his physical exam, we do feel that it would be in his best interest to obtain dedicated SI joint x-rays to further assess our concern for stenosis, a bulging / herniated disk or other concerning pathology. We have also provided him with a referral to physical therapy as well as Dr. Walton to further discuss possible non-operative treatment options to include different types of steroid injections/ablations/blocks.      We also discussed signs of a worsening problem that he should seek being evaluated. Next step would be to consider a lumbar MRI to include the distal thoracic " spine.        Respectfully,     LAKISHA Wagner, ESEQUIEL  [facility]surgery  [facility]  Tel: [phone]      Dr. Dean sherman.

## 2024-06-11 NOTE — LETTER
"6/11/2024      Ten Haro  [id number]  [facility] [address]      Dear Colleague,    Thank you for referring your patient, Ten Haro, to the [facility] NEUROSURGERY CLINIC [place]. Please see a copy of my visit note below.    NEUROSURGERY CLINIC CONSULT NOTE     DATE OF VISIT: 6/11/2024     SUBJECTIVE:     Ten Haro is a pleasant 33 year old male who presents to the clinic today for consultation on left SI joint pain. He is referred to the Neurosurgery Clinic by Dr. Mark in Primary Care.     Today, he reports a four-year history of symptoms. He describes a daily with fluctuating intensity, sharp, aching pain that initiates in the left low lumbar / sacroiliac region and radiates to his left testicle. He states that there is an audible \"pop\" at times. He has had a hernia repair for this which did not provide any alleviation. This pain is not accompanied by paresthesia, numbness or perceived weakness in the same distribution. Mobility seems to aggravate the symptoms, while alleviation is obtained by rest. No mechanism of injury such as trauma or a fall is associated with the onset of the pain. There are no bowel or bladder changes. He denies saddle anesthesia. He denies changes in gait, instability, or falling episodes.     He has participated in conservative therapies to include physical therapy and chiropractic care.        Current Outpatient Medications:      ofloxacin (OCUFLOX) 0.3 % ophthalmic solution, Place 1 drop into the right eye 4 times daily for 7 days, Disp: 10 mL, Rfl: 0     Allergies   Allergen Reactions     No Known Allergies         Past Medical History:   Diagnosis Date     Non-recurrent unilateral inguinal hernia without obstruction or gangrene         ROS: 10 point review of symptoms are negative other than the symptoms noted above in the HPI.     Family History has been reviewed with the patient, there are no pertinent findings to " presenting concern.     Past Surgical History:   Procedure Laterality Date     APPENDECTOMY      2002     DAVINCI XI HERNIORRHAPHY INGUINAL Left 08/18/2022    Procedure: Xi Robotic Assisted HERNIORRHAPHY, INGUINAL, LEFT;  Surgeon: Corrie Colon MD;  Location: [facility] OR     HERNIA REPAIR  8/22        Social History     Tobacco Use     Smoking status: Never     Smokeless tobacco: Never   Vaping Use     Vaping status: Never Used   Substance Use Topics     Alcohol use: Yes     Comment: 1 drink per month     Drug use: Not Currently     Types: Marijuana        OBJECTIVE:   /80   Pulse 77   SpO2 99%    There is no height or weight on file to calculate BMI.     Imaging:     Lumbar x-ray at [facility] from 01/2023.     Full radiological report in chart. Imaging was reviewed with with patient today.     Exam:     Patient appears comfortable, conversational, and in no apparent distress.   Head: Normocephalic, without obvious abnormality, atraumatic, no facial asymmetry.   Eyes: conjunctivae/corneas clear. PERRL, EOM's intact.   Throat: lips, mucosa, and tongue normal; teeth and gums normal.   Neck: supple, symmetrical, trachea midline, no adenopathy and thyroid: not enlarged, symmetric, no tenderness/mass/nodules.   Lungs: clear to auscultation bilaterally.   Heart: regular rate and rhythm.   Abdomen: soft, non-tender; bowel sounds normal; no masses, no organomegaly.   Pulses: 2+ and symmetric.   Skin: Skin color, texture, turgor normal. No rashes or lesions.     CN II-XII grossly intact, alert and appropriate with conversation and following commands.   Gait is non-antalgic. Able to tandem walk. Able to walk on toes and heels without difficulty.   Cervical spine is non tender to palpation. Appropriate range of motion of neck, not concerning for lhermitte's phenomenon.   Bilateral bicep 2/4 and tricep reflexes 1/4. Sensation intact throughout upper extremities.     UE muscle strength  Right  Left    Deltoid  5/5  5/5   "  Biceps  5/5  5/5    Triceps  5/5  5/5    Hand intrinsics  5/5  5/5    Hand grasp  5/5  5/5    Castellon signs  neg  neg      Lumbar spine is non tender to palpation.  Intact sensation throughout lower extremities.   Bilateral patellar 2/4 and achilles reflex 2/4. Negative for pain with straight leg raise.     LE muscle strength  Right  Left    Iliopsoas (hip flexion)  5/5  5/5    Quad (knee extension)  5/5  5/5    Hamstring (knee flexion)  5/5  5/5    Gastrocnemius (PF)  5/5  5/5    Tibialis Ant. (DF)  5/5  5/5    EHL  5/5  5/5      Negative Babinski bilaterally. Negative for clonus.  Left SI joint is painful to palpation.    + Edison test  + Thigh Thrust test  + Distraction test  Calves are soft and non-tender bilaterally.       ASSESSMENT/PLAN:     Ten Haro is a 33 year old male who presents to the clinic for consultation on what sounds like left SI joint dysfunction. He describes a daily with fluctuating intensity, sharp, aching pain that initiates in the left low lumbar / sacroiliac region and radiates to his left testicle. He states that there is an audible \"pop\" at times. He has had a hernia repair for this which did not provide any alleviation. This pain is not accompanied by paresthesia, numbness or perceived weakness in the same distribution. He does not have any recent or dedicated imaging for this. He did have a hernia repair for this pain which did not provided any alleviation. On exam, he is noted to have appropriate strength, sensation and range of motion. Left SI joint is painful to palpation. He has attempted conservative management with primarily chiropractic care, without resolution of symptoms.     Based on his physical exam, we do feel that it would be in his best interest to obtain dedicated SI joint x-rays to further assess our concern for stenosis, a bulging / herniated disk or other concerning pathology. We have also provided him with a referral to physical therapy as well as " Dr. Walton to further discuss possible non-operative treatment options to include different types of steroid injections/ablations/blocks.      We also discussed signs of a worsening problem that he should seek being evaluated. Next step would be to consider a lumbar MRI to include the distal thoracic spine.        Respectfully,     LAKISHA Wagner PA-C  [facility]  [facility]  Tel: [phone]      Dr. Moran [facility].       Again, thank you for allowing me to participate in the care of your patient.        Sincerely,        Rashawn Thompson PA-C

## 2024-06-17 ASSESSMENT — ACTIVITIES OF DAILY LIVING (ADL)
HOW_WOULD_YOU_RATE_YOUR_CURRENT_LEVEL_OF_FUNCTION_DURING_YOUR_SPORTS_RELATED_ACTIVITIES_FROM_0_TO_100_WITH_100_BEING_YOUR_LEVEL_OF_FUNCTION_PRIOR_TO_YOUR_HIP_PROBLEM_AND_0_BEING_THE_INABILITY_TO_PERFORM_ANY_OF_YOUR_USUAL_DAILY_ACTIVITIES?: 70
TWISTING/PIVOTING ON INVOLVED LEG: MODERATE DIFFICULTY
DEEP SQUATTING: MODERATE DIFFICULTY
GETTING INTO AND OUT OF AN AVERAGE CAR: SLIGHT DIFFICULTY
ABILITY_TO_PERFORM_ACTIVITY_WITH_YOUR_NORMAL_TECHNIQUE: MODERATE DIFFICULTY
STANDING_FOR_15_MINUTES: SLIGHT DIFFICULTY
HOS_ADL_SCORE(%): 69.12
ADL_TOTAL_ITEM_SCORE: 0
GETTING_INTO_AND_OUT_OF_A_BATHTUB: SLIGHT DIFFICULTY
ADL_HIGHEST_POTENTIAL_SCORE: 68
TWISTING/PIVOTING_ON_INVOLVED_LEG: MODERATE DIFFICULTY
GOING_DOWN_1_FLIGHT_OF_STAIRS: SLIGHT DIFFICULTY
GOING_UP_1_FLIGHT_OF_STAIRS: SLIGHT DIFFICULTY
GOING DOWN 1 FLIGHT OF STAIRS: SLIGHT DIFFICULTY
STARTING_AND_STOPPING_QUICKLY: SLIGHT DIFFICULTY
SPORTS_COUNT: 9
STEPPING UP AND DOWN CURBS: NO DIFFICULTY AT ALL
WALKING_DOWN_STEEP_HILLS: SLIGHT DIFFICULTY
STANDING FOR 15 MINUTES: SLIGHT DIFFICULTY
SWINGING_OBJECTS_LIKE_A_GOLF_CLUB: MODERATE DIFFICULTY
ROLLING OVER IN BED: SLIGHT DIFFICULTY
ADL_COUNT: 17
WALKING_15_MINUTES_OR_GREATER: MODERATE DIFFICULTY
WALKING_FOR_APPROXIMATELY_10_MINUTES: SLIGHT DIFFICULTY
STEPPING_UP_AND_DOWN_CURBS: NO DIFFICULTY AT ALL
HOS_ADL_ITEM_SCORE_TOTAL: 47
ROLLING_OVER_IN_BED: SLIGHT DIFFICULTY
LANDING: SLIGHT DIFFICULTY
DEEP_SQUATTING: MODERATE DIFFICULTY
LIGHT_TO_MODERATE_WORK: SLIGHT DIFFICULTY
SITTING FOR 15 MINUTES: NO DIFFICULTY AT ALL
ADL_SCORE(%): 0
SPORTS_SCORE(%): 0
SPORTS_HIGHEST_POTENTIAL_SCORE: 36
JUMPING: MODERATE DIFFICULTY
HOW_WOULD_YOU_RATE_YOUR_CURRENT_LEVEL_OF_FUNCTION_DURING_YOUR_USUAL_ACTIVITIES_OF_DAILY_LIVING_FROM_0_TO_100_WITH_100_BEING_YOUR_LEVEL_OF_FUNCTION_PRIOR_TO_YOUR_HIP_PROBLEM_AND_0_BEING_THE_INABILITY_TO_PERFORM_ANY_OF_YOUR_USUAL_DAILY_ACTIVITIES?: 70
WALKING_UP_STEEP_HILLS: MODERATE DIFFICULTY
WALKING_DOWN_STEEP_HILLS: SLIGHT DIFFICULTY
HEAVY_WORK: MODERATE DIFFICULTY
GETTING_INTO_AND_OUT_OF_A_BATHTUB: SLIGHT DIFFICULTY
SITTING_FOR_15_MINUTES: NO DIFFICULTY AT ALL
WALKING_APPROXIMATELY_10_MINUTES: SLIGHT DIFFICULTY
RUNNING_ONE_MILE: EXTREME DIFFICULTY
WALKING_UP_STEEP_HILLS: MODERATE DIFFICULTY
RECREATIONAL ACTIVITIES: MODERATE DIFFICULTY
HOW_WOULD_YOU_RATE_YOUR_CURRENT_LEVEL_OF_FUNCTION_DURING_YOUR_USUAL_ACTIVITIES_OF_DAILY_LIVING_FROM_0_TO_100_WITH_100_BEING_YOUR_LEVEL_OF_FUNCTION_PRIOR_TO_YOUR_HIP_PROBLEM_AND_0_BEING_THE_INABILITY_TO_PERFORM_ANY_OF_YOUR_USUAL_DAILY_ACTIVITIES?: 70
CUTTING/LATERAL_MOVEMENTS: SLIGHT DIFFICULTY
SPORTS_TOTAL_ITEM_SCORE: 0
PUTTING_ON_SOCKS_AND_SHOES: MODERATE DIFFICULTY
GETTING_INTO_AND_OUT_OF_AN_AVERAGE_CAR: SLIGHT DIFFICULTY
HOS_ADL_HIGHEST_POTENTIAL_SCORE: 68
PUTTING ON SOCKS AND SHOES: MODERATE DIFFICULTY
WALKING_INITIALLY: NO DIFFICULTY AT ALL
HEAVY_WORK: MODERATE DIFFICULTY
ABILITY_TO_PARTICIPATE_IN_YOUR_DESIRED_SPORT_AS_LONG_AS_YOU_WOULD_LIKE: MODERATE DIFFICULTY
LOW_IMPACT_ACTIVITIES_LIKE_FAST_WALKING: SLIGHT DIFFICULTY
WALKING_INITIALLY: NO DIFFICULTY AT ALL
RECREATIONAL_ACTIVITIES: MODERATE DIFFICULTY
HOW_WOULD_YOU_RATE_YOUR_CURRENT_LEVEL_OF_FUNCTION?: NEARLY NORMAL
PLEASE_INDICATE_YOR_PRIMARY_REASON_FOR_REFERRAL_TO_THERAPY:: HIP
LIGHT_TO_MODERATE_WORK: SLIGHT DIFFICULTY
WALKING_15_MINUTES_OR_GREATER: MODERATE DIFFICULTY
GOING UP 1 FLIGHT OF STAIRS: SLIGHT DIFFICULTY

## 2024-06-18 ENCOUNTER — THERAPY VISIT (OUTPATIENT)
Dept: PHYSICAL THERAPY | Facility: CLINIC | Age: 34
End: 2024-06-18
Attending: INTERNAL MEDICINE
Payer: COMMERCIAL

## 2024-06-18 DIAGNOSIS — M25.552 HIP PAIN, LEFT: Primary | ICD-10-CM

## 2024-06-18 DIAGNOSIS — M53.3 SI (SACROILIAC) JOINT DYSFUNCTION: ICD-10-CM

## 2024-06-18 PROCEDURE — 97161 PT EVAL LOW COMPLEX 20 MIN: CPT | Mod: GP | Performed by: PHYSICAL THERAPIST

## 2024-06-18 PROCEDURE — 97110 THERAPEUTIC EXERCISES: CPT | Mod: GP | Performed by: PHYSICAL THERAPIST

## 2024-06-18 NOTE — PROGRESS NOTES
"PHYSICAL THERAPY EVALUATION  Type of Visit: Evaluation       Fall Risk Screen:  Fall screen completed by: PT  Have you fallen 2 or more times in the past year?: No  Have you fallen and had an injury in the past year?: No  Is patient a fall risk?: No    Subjective       Presenting condition or subjective complaint: Left hip pain.  Pt complains of L hip/buttock pain/SIJ pain ongoing for past 4 year.  Originally thought it was a hernia so had hernia repair 2 years ago, did not resolve symptoms.  Pt points to pain in L PSIS and wraps to L anterior hip and L testicle.  Sometimes/rare, can feel down L knee but only if it is really bad.  Pt recalls when this first started, he was getting off the couch and felt a \"snap\" in L groin.  Pt reports also about a month ago, when he lays straight and does a SLR with L LE, can feel a popping sound in his L hip.  Pt reports he has not had PT in the past for this.  Was referred about a year ago but did not go as did not have time/resources.  Denies bowel/bladder changes.  Denies difficulty with sleep.  Denies numbness/tingling in LE.  Date of onset: 06/11/24 (MD order date)    Relevant medical history:     Past Medical History:   Diagnosis Date    Non-recurrent unilateral inguinal hernia without obstruction or gangrene      Dates & types of surgery: Hernia repair two years ago   Past Surgical History:   Procedure Laterality Date    APPENDECTOMY      2002    DAVINCI XI HERNIORRHAPHY INGUINAL Left 08/18/2022    Procedure: Xi Robotic Assisted HERNIORRHAPHY, INGUINAL, LEFT;  Surgeon: Corrie Colon MD;  Location: [facility] OR    HERNIA REPAIR  8/22     Prior diagnostic imaging/testing results: X-ray   of PELVIS  IMPRESSION: Normal bones, joint spaces and alignment. No ankylosis. No  signs of degenerative or inflammatory arthropathy of the SI joints.      Prior therapy history for the same diagnosis, illness or injury: No      Prior Level of Function  Transfers:   Ambulation:   ADL:   IADL: "     Living Environment  Social support: With a significant other or spouse   Type of home: House   Stairs to enter the home: Yes 2 Is there a railing: Yes     Ramp: No   Stairs inside the home: Yes 2 Is there a railing: Yes     Help at home: None  Equipment owned:       Employment: Yes   Hobbies/Interests: Mountain biking    Patient goals for therapy: Normal tasks without pain    Pain assessment:      Objective   LUMBAR SPINE EVALUATION  PAIN: Pain Level at Rest: 3/10  Pain Level with Use: 9/10  Pain Location: hip and L hip/groin area, L PSIS area  Pain Quality: Dull and burning sensation  Pain Frequency: intermittent  Pain is Worst: same/does not matter time of day  Pain is Exacerbated By: standing/walking > 10-15 min, stairs  Pain is Relieved By: NSAIDs and rest  Pain Progression: Unchanged  INTEGUMENTARY (edema, incisions): WNL  POSTURE: WNL  GAIT:   Weightbearing Status: WBAT  Assistive Device(s):   Gait Deviations:   BALANCE/PROPRIOCEPTION: WNL  WEIGHTBEARING ALIGNMENT: WNL  NON-WEIGHTBEARING ALIGNMENT:    ROM:  Lumbar AROM all WNL and painfree.  L hip PROM flex min loss with firm endfeel/resistance.  Limited L hip extension PROM compared to R side.    PELVIC/SI SCREEN: Standing Forward Bend: negative , negative scour test, negative thigh thrust test, negative SUSAN, +FADIR  STRENGTH:  L hip abd strength 4-/5, L hip SLR 4/5, L hip ext 4-/5.   R hip abd strength 5/5, R hip SLR flex 5/5, R hip ext 5/5    MYOTOMES: WNL  DTR S:   CORD SIGNS:   DERMATOMES: WNL  NEURAL TENSION:   FLEXIBILITY: Decreased hip IR L, Decreased adductors L, Decreased piriformis L, Decreased hip flexors L  LUMBAR/HIP Special Tests:    PELVIS/SI SPECIAL TESTS:   FUNCTIONAL TESTS: Double Leg Squat: Anterior knee translation and Improper use of glutes/hips  Single Leg Squat: Anterior knee translation, Knee valgus, Hip internal rotation, Improper use of glutes/hips, and L more unstable than R  PALPATION:  no tenderness to palpation L  PSIS  SPINAL SEGMENTAL CONCLUSIONS:       Assessment & Plan   CLINICAL IMPRESSIONS  Medical Diagnosis: L SIJ dysfunction    Treatment Diagnosis: L hip pain   Impression/Assessment: Patient is a 33 year old male with L hip pain complaints.  The following significant findings have been identified: Pain, Decreased ROM/flexibility, Decreased joint mobility, Decreased strength, Impaired muscle performance, and Decreased activity tolerance. These impairments interfere with their ability to perform self care tasks, work tasks, recreational activities, and community mobility as compared to previous level of function.     Clinical Decision Making (Complexity):  Clinical Presentation: Stable/Uncomplicated  Clinical Presentation Rationale: based on medical and personal factors listed in PT evaluation  Clinical Decision Making (Complexity): Low complexity    PLAN OF CARE  Treatment Interventions:  Interventions: Manual Therapy, Neuromuscular Re-education, Therapeutic Activity, Therapeutic Exercise, Self-Care/Home Management    Long Term Goals     PT Goal 1  Goal Identifier: ambulation  Goal Description: Pt will improve L hip abd strength to 5/5 to be able to walk x 30 min 0/10 pain  Rationale: to maximize safety and independence within the community  Goal Progress: unable to walk > 15 min, L hip abd strength 4-/5  Target Date: 02/08/26      Frequency of Treatment: 1 x a week  Duration of Treatment: 3 weeks tapering to 2 times a month x 2 months    Recommended Referrals to Other Professionals: Physical Therapy  Education Assessment:        Risks and benefits of evaluation/treatment have been explained.   Patient/Family/caregiver agrees with Plan of Care.     Evaluation Time:     PT Eval, Low Complexity Minutes (69593): 15       Signing Clinician: Ranjit Jerome PT

## 2024-06-26 ENCOUNTER — THERAPY VISIT (OUTPATIENT)
Dept: PHYSICAL THERAPY | Facility: CLINIC | Age: 34
End: 2024-06-26
Payer: COMMERCIAL

## 2024-06-26 DIAGNOSIS — M53.3 SI (SACROILIAC) JOINT DYSFUNCTION: Primary | ICD-10-CM

## 2024-06-26 DIAGNOSIS — M25.552 HIP PAIN, LEFT: ICD-10-CM

## 2024-06-26 PROCEDURE — 97110 THERAPEUTIC EXERCISES: CPT | Mod: GP | Performed by: PHYSICAL THERAPIST

## 2024-12-09 ENCOUNTER — OFFICE VISIT (OUTPATIENT)
Dept: URGENT CARE | Facility: URGENT CARE | Age: 34
End: 2024-12-09
Payer: COMMERCIAL

## 2024-12-09 ENCOUNTER — ANCILLARY PROCEDURE (OUTPATIENT)
Dept: GENERAL RADIOLOGY | Facility: CLINIC | Age: 34
End: 2024-12-09
Attending: PREVENTIVE MEDICINE
Payer: COMMERCIAL

## 2024-12-09 VITALS
DIASTOLIC BLOOD PRESSURE: 87 MMHG | RESPIRATION RATE: 17 BRPM | BODY MASS INDEX: 23.98 KG/M2 | OXYGEN SATURATION: 99 % | TEMPERATURE: 98.7 F | SYSTOLIC BLOOD PRESSURE: 138 MMHG | HEART RATE: 79 BPM | WEIGHT: 170 LBS

## 2024-12-09 DIAGNOSIS — M79.672 BILATERAL FOOT PAIN: ICD-10-CM

## 2024-12-09 DIAGNOSIS — M79.671 BILATERAL FOOT PAIN: ICD-10-CM

## 2024-12-09 DIAGNOSIS — L03.032 PARONYCHIA OF TOE, LEFT: Primary | ICD-10-CM

## 2024-12-09 LAB
ALBUMIN SERPL BCG-MCNC: 4.5 G/DL (ref 3.5–5.2)
ALP SERPL-CCNC: 80 U/L (ref 40–150)
ALT SERPL W P-5'-P-CCNC: 21 U/L (ref 0–70)
ANION GAP SERPL CALCULATED.3IONS-SCNC: 9 MMOL/L (ref 7–15)
AST SERPL W P-5'-P-CCNC: 18 U/L (ref 0–45)
BASOPHILS # BLD AUTO: 0.1 10E3/UL (ref 0–0.2)
BASOPHILS NFR BLD AUTO: 1 %
BILIRUB SERPL-MCNC: 0.6 MG/DL
BUN SERPL-MCNC: 14.7 MG/DL (ref 6–20)
CALCIUM SERPL-MCNC: 9.4 MG/DL (ref 8.8–10.4)
CHLORIDE SERPL-SCNC: 104 MMOL/L (ref 98–107)
CREAT SERPL-MCNC: 0.91 MG/DL (ref 0.67–1.17)
CRP SERPL-MCNC: 17.08 MG/L
EGFRCR SERPLBLD CKD-EPI 2021: >90 ML/MIN/1.73M2
EOSINOPHIL # BLD AUTO: 0.1 10E3/UL (ref 0–0.7)
EOSINOPHIL NFR BLD AUTO: 1 %
ERYTHROCYTE [DISTWIDTH] IN BLOOD BY AUTOMATED COUNT: 12.6 % (ref 10–15)
ERYTHROCYTE [SEDIMENTATION RATE] IN BLOOD BY WESTERGREN METHOD: 13 MM/HR (ref 0–15)
GLUCOSE SERPL-MCNC: 90 MG/DL (ref 70–99)
HCO3 SERPL-SCNC: 27 MMOL/L (ref 22–29)
HCT VFR BLD AUTO: 44.1 % (ref 40–53)
HGB BLD-MCNC: 14.4 G/DL (ref 13.3–17.7)
IMM GRANULOCYTES # BLD: 0 10E3/UL
IMM GRANULOCYTES NFR BLD: 0 %
LYMPHOCYTES # BLD AUTO: 2.8 10E3/UL (ref 0.8–5.3)
LYMPHOCYTES NFR BLD AUTO: 32 %
MCH RBC QN AUTO: 26.8 PG (ref 26.5–33)
MCHC RBC AUTO-ENTMCNC: 32.7 G/DL (ref 31.5–36.5)
MCV RBC AUTO: 82 FL (ref 78–100)
MONOCYTES # BLD AUTO: 0.9 10E3/UL (ref 0–1.3)
MONOCYTES NFR BLD AUTO: 10 %
NEUTROPHILS # BLD AUTO: 4.8 10E3/UL (ref 1.6–8.3)
NEUTROPHILS NFR BLD AUTO: 56 %
PLATELET # BLD AUTO: 223 10E3/UL (ref 150–450)
POTASSIUM SERPL-SCNC: 4 MMOL/L (ref 3.4–5.3)
PROT SERPL-MCNC: 8.1 G/DL (ref 6.4–8.3)
RBC # BLD AUTO: 5.38 10E6/UL (ref 4.4–5.9)
SODIUM SERPL-SCNC: 140 MMOL/L (ref 135–145)
URATE SERPL-MCNC: 3.8 MG/DL (ref 3.4–7)
WBC # BLD AUTO: 8.6 10E3/UL (ref 4–11)

## 2024-12-09 PROCEDURE — 99214 OFFICE O/P EST MOD 30 MIN: CPT

## 2024-12-09 PROCEDURE — 85652 RBC SED RATE AUTOMATED: CPT

## 2024-12-09 PROCEDURE — 73630 X-RAY EXAM OF FOOT: CPT | Mod: TC | Performed by: RADIOLOGY

## 2024-12-09 PROCEDURE — 85025 COMPLETE CBC W/AUTO DIFF WBC: CPT

## 2024-12-09 PROCEDURE — 86140 C-REACTIVE PROTEIN: CPT

## 2024-12-09 PROCEDURE — 80053 COMPREHEN METABOLIC PANEL: CPT

## 2024-12-09 PROCEDURE — 36415 COLL VENOUS BLD VENIPUNCTURE: CPT

## 2024-12-09 PROCEDURE — 84550 ASSAY OF BLOOD/URIC ACID: CPT

## 2024-12-09 RX ORDER — DOXYCYCLINE 100 MG/1
100 TABLET ORAL 2 TIMES DAILY
Qty: 14 TABLET | Refills: 0 | Status: SHIPPED | OUTPATIENT
Start: 2024-12-09 | End: 2024-12-16

## 2024-12-09 NOTE — PROGRESS NOTES
Assessment & Plan       (L03.032) Paronychia of toe, left  (primary encounter diagnosis)  Doxycycline for 7 days  Warm soaks  Tylenol as needed    Follow up in 10-14 days if not improving or sooner as needed                   No follow-ups on file.    Brenden Xiao MD  [facility] URGENT CARE    Subjective     Ten Haro is a 34 year old year old male who presents to clinic today for the following health issues:    Patient presents with:  Urgent Care: Pt present with pain and swelling on L small toe and R big toe, warm to the touch, onset 2 days.     This is a 35 yo male who presents with discomfort of his left 4th and 5th distal toes and redness distally there.  Also small area of discomfort on r first on medial aspect of proximal phalanx.  No trauma, hx of gout.  Was moving furniture before this pain started    Patient Active Problem List   Diagnosis    History of sciatica    FH: melanoma    SI (sacroiliac) joint dysfunction    Hip pain, left       No current outpatient medications on file.     No current facility-administered medications for this visit.       Past Medical History:   Diagnosis Date    Non-recurrent unilateral inguinal hernia without obstruction or gangrene        Social History   reports that he has never smoked. He has never used smokeless tobacco. He reports current alcohol use. He reports that he does not currently use drugs after having used the following drugs: Marijuana.    Family History   Problem Relation Age of Onset    Hypertension Mother     Hypertension Father     Melanoma Father     Arthritis Father         hip, early-onset    Other Cancer Father     Asthma Sister     Skin Cancer Paternal Uncle     Skin Cancer Paternal Uncle     Prostate Cancer No family hx of     Colorectal Cancer No family hx of        Review of Systems  Constitutional, HEENT, cardiovascular, pulmonary, GI, , musculoskeletal, neuro, skin, endocrine and psych systems are  negative, except as otherwise noted.      Objective    /87   Pulse 79   Temp 98.7  F (37.1  C) (Tympanic)   Resp 17   Wt 77.1 kg (170 lb)   SpO2 99%   BMI 23.98 kg/m    Physical Exam   GENERAL: alert and no distress  EYES: Eyes grossly normal to inspection, PERRL and conjunctivae and sclerae normal  HENT: ear canals and TM's normal, nose and mouth without ulcers or lesions  NECK: no adenopathy, no asymmetry, masses, or scars  RESP: lungs clear to auscultation - no rales, rhonchi or wheezes  CV: regular rate and rhythm, normal S1 S2, no S3 or S4, no murmur, click or rub, no peripheral edema  ABDOMEN: soft, nontender, no hepatosplenomegaly, no masses and bowel sounds normal  MS: no gross musculoskeletal defects noted, no edema  SKIN: no suspicious lesions or rashes  NEURO: Normal strength and tone, mentation intact and speech normal  PSYCH: mentation appears normal, affect normal/bright  FEET - normal dp and pt pulses, normal sensation on monofilament testing, no sores noted, slight redness around toenails of l 4th and 5th toes, nl cms b feet      CBC, ESR, CMP, CRP, uric acid all wnl    Xray b feet - no fracture, OA, dislocation per my read

## 2025-05-23 ENCOUNTER — OFFICE VISIT (OUTPATIENT)
Dept: FAMILY MEDICINE | Facility: CLINIC | Age: 35
End: 2025-05-23
Payer: COMMERCIAL

## 2025-05-23 VITALS
HEART RATE: 66 BPM | OXYGEN SATURATION: 99 % | RESPIRATION RATE: 14 BRPM | HEIGHT: 71 IN | WEIGHT: 166 LBS | TEMPERATURE: 96.9 F | BODY MASS INDEX: 23.24 KG/M2 | DIASTOLIC BLOOD PRESSURE: 82 MMHG | SYSTOLIC BLOOD PRESSURE: 120 MMHG

## 2025-05-23 DIAGNOSIS — D22.9 SKIN MOLE: ICD-10-CM

## 2025-05-23 DIAGNOSIS — F90.2 ATTENTION DEFICIT HYPERACTIVITY DISORDER (ADHD), COMBINED TYPE: ICD-10-CM

## 2025-05-23 DIAGNOSIS — Z00.00 ROUTINE HISTORY AND PHYSICAL EXAMINATION OF ADULT: Primary | ICD-10-CM

## 2025-05-23 PROCEDURE — 99395 PREV VISIT EST AGE 18-39: CPT | Performed by: INTERNAL MEDICINE

## 2025-05-23 PROCEDURE — 3079F DIAST BP 80-89 MM HG: CPT | Performed by: INTERNAL MEDICINE

## 2025-05-23 PROCEDURE — 1126F AMNT PAIN NOTED NONE PRSNT: CPT | Performed by: INTERNAL MEDICINE

## 2025-05-23 PROCEDURE — 3074F SYST BP LT 130 MM HG: CPT | Performed by: INTERNAL MEDICINE

## 2025-05-23 RX ORDER — DEXTROAMPHETAMINE SACCHARATE, AMPHETAMINE ASPARTATE, DEXTROAMPHETAMINE SULFATE AND AMPHETAMINE SULFATE 1.25; 1.25; 1.25; 1.25 MG/1; MG/1; MG/1; MG/1
5 TABLET ORAL 2 TIMES DAILY
Qty: 60 TABLET | Refills: 0 | Status: SHIPPED | OUTPATIENT
Start: 2025-06-22 | End: 2025-07-22

## 2025-05-23 RX ORDER — DEXTROAMPHETAMINE SACCHARATE, AMPHETAMINE ASPARTATE, DEXTROAMPHETAMINE SULFATE AND AMPHETAMINE SULFATE 1.25; 1.25; 1.25; 1.25 MG/1; MG/1; MG/1; MG/1
5 TABLET ORAL 2 TIMES DAILY
Qty: 60 TABLET | Refills: 0 | Status: SHIPPED | OUTPATIENT
Start: 2025-05-23 | End: 2025-06-22

## 2025-05-23 RX ORDER — DEXTROAMPHETAMINE SACCHARATE, AMPHETAMINE ASPARTATE, DEXTROAMPHETAMINE SULFATE AND AMPHETAMINE SULFATE 1.25; 1.25; 1.25; 1.25 MG/1; MG/1; MG/1; MG/1
5 TABLET ORAL 2 TIMES DAILY
Qty: 60 TABLET | Refills: 0 | Status: SHIPPED | OUTPATIENT
Start: 2025-07-22 | End: 2025-08-21

## 2025-05-23 SDOH — HEALTH STABILITY: PHYSICAL HEALTH: ON AVERAGE, HOW MANY DAYS PER WEEK DO YOU ENGAGE IN MODERATE TO STRENUOUS EXERCISE (LIKE A BRISK WALK)?: 5 DAYS

## 2025-05-23 SDOH — HEALTH STABILITY: PHYSICAL HEALTH: ON AVERAGE, HOW MANY MINUTES DO YOU ENGAGE IN EXERCISE AT THIS LEVEL?: 30 MIN

## 2025-05-23 ASSESSMENT — SOCIAL DETERMINANTS OF HEALTH (SDOH): HOW OFTEN DO YOU GET TOGETHER WITH FRIENDS OR RELATIVES?: ONCE A WEEK

## 2025-05-23 ASSESSMENT — PAIN SCALES - GENERAL: PAINLEVEL_OUTOF10: NO PAIN (0)

## 2025-05-23 NOTE — PROGRESS NOTES
Preventive Care Visit  [facility]  Nuria Mark MD, Internal Medicine  May 23, 2025      Assessment & Plan     Routine history and physical examination of adult  - diet, exercise  - REVIEW OF HEALTH MAINTENANCE PROTOCOL ORDERS    Attention deficit hyperactivity disorder (ADHD), combined type  - symptoms not well controlled  - amphetamine-dextroamphetamine (ADDERALL) 5 MG tablet  Dispense: 60 tablet; Refill: 0  - amphetamine-dextroamphetamine (ADDERALL) 5 MG tablet  Dispense: 60 tablet; Refill: 0  - amphetamine-dextroamphetamine (ADDERALL) 5 MG tablet  Dispense: 60 tablet; Refill: 0    Skin mole  - Adult Dermatology  Referral        Patient has been advised of split billing requirements and indicates understanding: Yes        Counseling  Appropriate preventive services were addressed with this patient via screening, questionnaire, or discussion as appropriate for fall prevention, nutrition, physical activity, Tobacco-use cessation, social engagement, weight loss and cognition.  Checklist reviewing preventive services available has been given to the patient.  Reviewed patient's diet, addressing concerns and/or questions.       Follow-up in 3 months      Dagmar Caal is a 34 year old, presenting for the following:  Physical (Fasting)        5/23/2025     7:53 AM   Additional Questions   Roomed by Kristie GREER  Patient presents to internal medicine clinic today for yearly physical exam.    Advance Care Planning    Discussed advance care planning with patient; informed AVS has link to [employer].        5/23/2025   General Health   How would you rate your overall physical health? Good   Feel stress (tense, anxious, or unable to sleep) Only a little   (!) STRESS CONCERN      5/23/2025   Nutrition   Three or more servings of calcium each day? Yes   Diet: Regular (no restrictions)   How many servings of fruit and vegetables per day? (!) 0-1   How many sweetened beverages each  day? 0-1         5/23/2025   Exercise   Days per week of moderate/strenous exercise 5 days   Average minutes spent exercising at this level 30 min         5/23/2025   Social Factors   Frequency of gathering with friends or relatives Once a week   Worry food won't last until get money to buy more No   Food not last or not have enough money for food? No   Do you have housing? (Housing is defined as stable permanent housing and does not include staying outside in a car, in a tent, in an abandoned building, in an overnight shelter, or couch-surfing.) Yes   Are you worried about losing your housing? No   Lack of transportation? No   Unable to get utilities (heat,electricity)? No         5/23/2025   Dental   Dentist two times every year? Yes         Today's PHQ-2 Score:       5/23/2025     7:41 AM   PHQ-2 ( 1999 [employer])   Q1: Little interest or pleasure in doing things 0   Q2: Feeling down, depressed or hopeless 0   PHQ-2 Score 0    Q1: Little interest or pleasure in doing things Not at all   Q2: Feeling down, depressed or hopeless Not at all   PHQ-2 Score 0       Patient-reported           5/23/2025   Substance Use   Alcohol more than 3/day or more than 7/wk No   Do you use any other substances recreationally? (!) CANNABIS PRODUCTS     Social History     Tobacco Use    Smoking status: Never    Smokeless tobacco: Never   Vaping Use    Vaping status: Never Used   Substance Use Topics    Alcohol use: Yes     Comment: 1 drink per month    Drug use: Not Currently     Types: Marijuana           5/23/2025   STI Screening   New sexual partner(s) since last STI/HIV test? No         5/23/2025   Contraception/Family Planning   Questions about contraception or family planning No        Reviewed and updated as needed this visit by Provider                    Past Medical History:   Diagnosis Date    ADHD (attention deficit hyperactivity disorder), combined type     Non-recurrent unilateral inguinal hernia without obstruction or  "gangrene          Review of Systems  Constitutional, HEENT, cardiovascular, pulmonary, GI, , musculoskeletal, neuro, skin, endocrine and psych systems are negative, except as otherwise noted.     Objective    Exam  /82 (BP Location: Right arm, Patient Position: Sitting, Cuff Size: Adult Regular)   Pulse 66   Temp 96.9  F (36.1  C) (Temporal)   Resp 14   Ht 1.791 m (5' 10.5\")   Wt 75.3 kg (166 lb)   SpO2 99%   BMI 23.48 kg/m     Estimated body mass index is 23.48 kg/m  as calculated from the following:    Height as of this encounter: 1.791 m (5' 10.5\").    Weight as of this encounter: 75.3 kg (166 lb).    Physical Exam  GENERAL: alert and no distress  EYES: Eyes grossly normal to inspection, conjunctivae and sclerae normal  HENT: ear canals and TM's normal, nose and mouth without ulcers or lesions  NECK: no asymmetry  RESP: lungs clear to auscultation - no rales, rhonchi or wheezes  CV: regular rate and rhythm, normal S1 S2  ABDOMEN: soft, nontender, bowel sounds normal  MS: no gross musculoskeletal defects noted, no edema  SKIN: multiple skin moles present on neck  NEURO: Normal strength and tone, mentation intact and speech normal  PSYCH: mentation appears normal, affect normal/bright        Signed Electronically by: Nuria Mark MD    "

## 2025-05-23 NOTE — LETTER
[facility]  05/23/25  Patient: Ten Haro  YOB: 1990  Medical Record Number: [id number]                                                                                  Non-Opioid Controlled Substance Agreement    This is an agreement between you and your provider regarding safe and appropriate use of controlled substances prescribed by your care team. Controlled substances are?medicines that can cause physical and mental dependence (abuse).     There are strict laws about having and using these medicines. We here at [facility] are  committed to working with you in your efforts to get better. To support you in this work, we'll help you schedule regular office appointments for medicine refills. If we must cancel or change your appointment for any reason, we'll make sure you have enough medicine to last until your next appointment.     As a Provider, I will:   Listen carefully to your concerns while treating you with respect.   Recommend a treatment plan that I believe is in your best interest and may involve therapies other than medicine.    Talk with you often about the possible benefits and the risk of harm of any medicine that we prescribe for you.  Assess the safety of this medicine and check how well it works.    Provide a plan on how to taper (discontinue or go off) using this medicine if the decision is made to stop its use.      ::  As a Patient, I understand controlled substances:     Are prescribed by my care provider to help me function or work and manage my condition(s).?  Are strong medicines and can cause serious side effects.     Need to be taken exactly as prescribed.?Combining controlled substances with certain medicines or chemicals (such as illegal drugs, alcohol, sedatives, sleeping pills, and benzodiazepines) can be dangerous or even fatal.? If I stop taking my medicines suddenly, I may have severe withdrawal symptoms.     The risks, benefits,  and side effects of these medicine(s) were explained to me. I agree that:    I will take part in other treatments as advised by my care team. This may be psychiatry or counseling, physical therapy, behavioral therapy, group treatment or a referral to specialist.    I will keep all my appointments and understand this is part of the monitoring of controlled substances.?My care team may require an office visit for EVERY controlled substance refill. If I miss appointments or don t follow instructions, my care team may stop my medicine    I will take my medicines as prescribed. I will not change the dose or schedule unless my care team tells me to. There will be no refills if I run out early.      I may be asked to come to the clinic and complete a urine drug test or complete a pill count. If I don t give a urine sample or participate in a pill count, the care team may stop my medicine.    I will only receive controlled substance prescriptions from this clinic. If I am treated by another provider, I will tell them that I am taking controlled substances and that I have a treatment agreement with this provider. I will inform my [facility] care team within one business day if I am given a prescription for any controlled substance by another healthcare provider. My [facility] care team can contact other providers and pharmacists about my use of any medicines.    It is up to me to make sure that I don't run out of my medicines on weekends or holidays.?If my care team is willing to refill my prescription without a visit, I must request refills only during office hours. Refills may take up to 3 business days to process. I will use one pharmacy to fill all my controlled substance prescriptions. I will notify the clinic about any changes to my insurance or medicine availability.    I am responsible for my prescriptions. If the medicine/prescription is lost, stolen or destroyed, it will not be replaced.?I also agree  not to share controlled substance medicines with anyone.     I am aware I should not use any illegal or recreational drugs. I agree not to drink alcohol unless my care team says I can.     If I enroll in the [place] [facility] Cannabis program, I will tell my care team before my next refill.    I will tell my care team right away if I become pregnant, have a new medical problem treated outside of my regular clinic, or have a change in my medicines.     I understand that this medicine can affect my thinking, judgment and reaction time.? Alcohol and drugs affect the brain and body, which can affect the safety of my driving. Being under the influence of alcohol or drugs can affect my decision-making, behaviors, personal safety and the safety of others. Driving while impaired (DWI) can occur if a person is driving, operating or in physical control of a car, motorcycle, boat, snowmobile, ATV, motorbike, off-road vehicle or any other motor vehicle ([place] Statute 169[place].20). I understand the risk if I choose to drive or operate any vehicle or machinery.    I understand that if I do not follow any of the conditions above, my prescriptions or treatment may be stopped or changed.   I agree that my provider, clinic care team and pharmacy may work with any city, state or federal law enforcement agency that investigates the misuse, sale or other diversion of my controlled medicine. I will allow my provider to discuss my care with, or share a copy of, this agreement with any other treating provider, pharmacy or emergency room where I receive care.     I have read this agreement and have asked questions about anything I did not understand.    ________________________________________________________  Patient Signature - Ten Haro     ___________________                   Date     ________________________________________________________  Provider Signature - Nuria Mark MD       ___________________                   Date      ________________________________________________________  Witness Signature (required if provider not present while patient signing)          ___________________                   Date

## 2025-05-26 ENCOUNTER — PATIENT OUTREACH (OUTPATIENT)
Dept: CARE COORDINATION | Facility: CLINIC | Age: 35
End: 2025-05-26
Payer: COMMERCIAL

## 2025-05-28 ENCOUNTER — PATIENT OUTREACH (OUTPATIENT)
Dept: CARE COORDINATION | Facility: CLINIC | Age: 35
End: 2025-05-28
Payer: COMMERCIAL

## 2025-08-18 ENCOUNTER — VIRTUAL VISIT (OUTPATIENT)
Dept: FAMILY MEDICINE | Facility: CLINIC | Age: 35
End: 2025-08-18
Payer: COMMERCIAL

## 2025-08-18 DIAGNOSIS — F90.2 ATTENTION DEFICIT HYPERACTIVITY DISORDER (ADHD), COMBINED TYPE: Primary | ICD-10-CM

## 2025-08-18 DIAGNOSIS — F41.9 ANXIETY: ICD-10-CM

## 2025-08-18 PROCEDURE — 1126F AMNT PAIN NOTED NONE PRSNT: CPT | Mod: 95 | Performed by: INTERNAL MEDICINE

## 2025-08-18 PROCEDURE — 98006 SYNCH AUDIO-VIDEO EST MOD 30: CPT | Performed by: INTERNAL MEDICINE

## 2025-08-18 RX ORDER — LISDEXAMFETAMINE DIMESYLATE 10 MG/1
10 CAPSULE ORAL EVERY MORNING
Qty: 30 CAPSULE | Refills: 0 | Status: SHIPPED | OUTPATIENT
Start: 2025-09-15

## 2025-08-18 RX ORDER — SERTRALINE HYDROCHLORIDE 25 MG/1
25 TABLET, FILM COATED ORAL DAILY
Qty: 90 TABLET | Refills: 3 | Status: SHIPPED | OUTPATIENT
Start: 2025-08-18

## 2025-08-18 RX ORDER — HYDROXYZINE HYDROCHLORIDE 25 MG/1
25 TABLET, FILM COATED ORAL 3 TIMES DAILY PRN
Qty: 90 TABLET | Refills: 3 | Status: SHIPPED | OUTPATIENT
Start: 2025-08-18

## 2025-08-18 RX ORDER — LISDEXAMFETAMINE DIMESYLATE 10 MG/1
10 CAPSULE ORAL EVERY MORNING
Qty: 30 CAPSULE | Refills: 0 | Status: SHIPPED | OUTPATIENT
Start: 2025-08-18

## 2025-08-19 ENCOUNTER — PATIENT OUTREACH (OUTPATIENT)
Dept: CARE COORDINATION | Facility: CLINIC | Age: 35
End: 2025-08-19
Payer: COMMERCIAL

## 2025-08-21 ENCOUNTER — PATIENT OUTREACH (OUTPATIENT)
Dept: CARE COORDINATION | Facility: CLINIC | Age: 35
End: 2025-08-21
Payer: COMMERCIAL

## (undated) DEVICE — Device

## (undated) DEVICE — ESU ELEC BLADE 2.75" COATED/INSULATED E1455

## (undated) DEVICE — SU VICRYL 3-0 SH 27" J316H

## (undated) DEVICE — GLOVE PROTEXIS MICRO 6.5  2D73PM65

## (undated) DEVICE — SOL WATER IRRIG 1000ML BOTTLE 2F7114

## (undated) DEVICE — LIGHT HANDLE X2

## (undated) DEVICE — SYSTEM LAPAROVUE VISIBILITY LAPVUE10

## (undated) DEVICE — BLADE KNIFE SURG 11 371111

## (undated) DEVICE — ESU GROUND PAD ADULT W/CORD E7507

## (undated) DEVICE — DAVINCI XI ESU FORCE BIPOLAR 8MM 471405

## (undated) DEVICE — DAVINCI XI OBTURATOR BLADELESS 8MM 470359

## (undated) DEVICE — LINEN ORTHO ACL PACK 5447

## (undated) DEVICE — DECANTER VIAL 2006S

## (undated) DEVICE — ESU PENCIL W/HOLSTER E2350H

## (undated) DEVICE — DAVINCI XI DRAPE ARM 470015

## (undated) DEVICE — SU VICRYL 0 UR-6 27" J603H

## (undated) DEVICE — SU WND CLOSURE VLOC 180 ABS 3-0 6" V-20 VLOCL0604

## (undated) DEVICE — SU VICRYL 4-0 PS-2 18" UND J496H

## (undated) DEVICE — GLOVE PROTEXIS BLUE W/NEU-THERA 7.0  2D73EB70

## (undated) DEVICE — LUBRICANT INST ELECTROLUBE EL101

## (undated) DEVICE — DAVINCI XI SEAL UNIVERSAL 5-8MM 470361

## (undated) DEVICE — DAVINCI HOT SHEARS TIP COVER  400180

## (undated) DEVICE — DAVINCI XI DRAPE COLUMN 470341

## (undated) RX ORDER — KETOROLAC TROMETHAMINE 30 MG/ML
INJECTION, SOLUTION INTRAMUSCULAR; INTRAVENOUS
Status: DISPENSED
Start: 2022-08-18

## (undated) RX ORDER — LIDOCAINE HYDROCHLORIDE 10 MG/ML
INJECTION, SOLUTION EPIDURAL; INFILTRATION; INTRACAUDAL; PERINEURAL
Status: DISPENSED
Start: 2022-08-18

## (undated) RX ORDER — DEXAMETHASONE SODIUM PHOSPHATE 4 MG/ML
INJECTION, SOLUTION INTRA-ARTICULAR; INTRALESIONAL; INTRAMUSCULAR; INTRAVENOUS; SOFT TISSUE
Status: DISPENSED
Start: 2022-08-18

## (undated) RX ORDER — PROPOFOL 10 MG/ML
INJECTION, EMULSION INTRAVENOUS
Status: DISPENSED
Start: 2022-08-18

## (undated) RX ORDER — ONDANSETRON 2 MG/ML
INJECTION INTRAMUSCULAR; INTRAVENOUS
Status: DISPENSED
Start: 2022-08-18

## (undated) RX ORDER — FENTANYL CITRATE 50 UG/ML
INJECTION, SOLUTION INTRAMUSCULAR; INTRAVENOUS
Status: DISPENSED
Start: 2022-08-18

## (undated) RX ORDER — CEFAZOLIN SODIUM/WATER 2 G/20 ML
SYRINGE (ML) INTRAVENOUS
Status: DISPENSED
Start: 2022-08-18

## (undated) RX ORDER — NALOXONE HYDROCHLORIDE 0.4 MG/ML
INJECTION, SOLUTION INTRAMUSCULAR; INTRAVENOUS; SUBCUTANEOUS
Status: DISPENSED
Start: 2022-08-18

## (undated) RX ORDER — BUPIVACAINE HYDROCHLORIDE AND EPINEPHRINE 2.5; 5 MG/ML; UG/ML
INJECTION, SOLUTION EPIDURAL; INFILTRATION; INTRACAUDAL; PERINEURAL
Status: DISPENSED
Start: 2022-08-18

## (undated) RX ORDER — GLYCOPYRROLATE 0.2 MG/ML
INJECTION, SOLUTION INTRAMUSCULAR; INTRAVENOUS
Status: DISPENSED
Start: 2022-08-18

## (undated) RX ORDER — NEOSTIGMINE METHYLSULFATE 1 MG/ML
VIAL (ML) INJECTION
Status: DISPENSED
Start: 2022-08-18